# Patient Record
Sex: FEMALE | Race: BLACK OR AFRICAN AMERICAN | NOT HISPANIC OR LATINO | Employment: FULL TIME | URBAN - METROPOLITAN AREA
[De-identification: names, ages, dates, MRNs, and addresses within clinical notes are randomized per-mention and may not be internally consistent; named-entity substitution may affect disease eponyms.]

---

## 2021-05-25 ENCOUNTER — OFFICE VISIT (OUTPATIENT)
Dept: INTERNAL MEDICINE | Facility: CLINIC | Age: 46
End: 2021-05-25
Payer: COMMERCIAL

## 2021-05-25 ENCOUNTER — ANCILLARY PROCEDURE (OUTPATIENT)
Dept: GENERAL RADIOLOGY | Facility: CLINIC | Age: 46
End: 2021-05-25
Payer: COMMERCIAL

## 2021-05-25 VITALS
WEIGHT: 293 LBS | HEART RATE: 86 BPM | OXYGEN SATURATION: 97 % | DIASTOLIC BLOOD PRESSURE: 92 MMHG | TEMPERATURE: 96.7 F | SYSTOLIC BLOOD PRESSURE: 132 MMHG

## 2021-05-25 DIAGNOSIS — M25.562 ACUTE PAIN OF LEFT KNEE: ICD-10-CM

## 2021-05-25 DIAGNOSIS — M25.562 ACUTE PAIN OF LEFT KNEE: Primary | ICD-10-CM

## 2021-05-25 DIAGNOSIS — Z23 NEED FOR VACCINATION: ICD-10-CM

## 2021-05-25 PROCEDURE — 90471 IMMUNIZATION ADMIN: CPT | Performed by: INTERNAL MEDICINE

## 2021-05-25 PROCEDURE — 99204 OFFICE O/P NEW MOD 45 MIN: CPT | Mod: 25 | Performed by: INTERNAL MEDICINE

## 2021-05-25 PROCEDURE — 90715 TDAP VACCINE 7 YRS/> IM: CPT | Performed by: INTERNAL MEDICINE

## 2021-05-25 PROCEDURE — 73562 X-RAY EXAM OF KNEE 3: CPT | Mod: LT | Performed by: RADIOLOGY

## 2021-05-25 RX ORDER — DICLOFENAC SODIUM 75 MG/1
75 TABLET, DELAYED RELEASE ORAL 2 TIMES DAILY PRN
Qty: 30 TABLET | Refills: 1 | Status: SHIPPED | OUTPATIENT
Start: 2021-05-25 | End: 2024-03-07

## 2021-05-25 NOTE — LETTER
May 25, 2021      Cathy Brown  [address] [facility] [address]         To Whom It May Concern:    Cathy Brown was seen in our clinic. She is dealing with an injury. 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 Please accommodate her during this time.      Sincerely,        Frank Diaz MD

## 2021-05-25 NOTE — PATIENT INSTRUCTIONS
- Sign-up for Rebls and I will send you a message on Rebls when I am able to look at the results of your X-ray from today

## 2021-06-03 ENCOUNTER — TELEPHONE (OUTPATIENT)
Dept: INTERNAL MEDICINE | Facility: CLINIC | Age: 46
End: 2021-06-03

## 2021-06-03 NOTE — TELEPHONE ENCOUNTER
Form filled out to the best of my knowledge, med list attached.     Put in Dr. Diaz's folder for signature.

## 2021-06-03 NOTE — TELEPHONE ENCOUNTER
Provider Statement    Reason for Call:  Form, our goal is to have forms completed with 72 hours, however, some forms may require a visit or additional information.    Type of letter, form or note:  FMLA    Who is the form from?: Insurance comp    Where did the form come from: form was faxed in    What clinic location was the form placed at?: Internal Medicine    Where the form was placed: Given to MA/RN    What number is listed as a contact on the form?: none       Additional comments: fax back to [phone]    Call taken on 6/3/2021 at 9:20 AM by Lucy Vega

## 2021-06-27 ENCOUNTER — HEALTH MAINTENANCE LETTER (OUTPATIENT)
Age: 46
End: 2021-06-27

## 2021-10-17 ENCOUNTER — HEALTH MAINTENANCE LETTER (OUTPATIENT)
Age: 46
End: 2021-10-17

## 2022-02-25 ENCOUNTER — LAB (OUTPATIENT)
Dept: URGENT CARE | Facility: URGENT CARE | Age: 47
End: 2022-02-25
Payer: COMMERCIAL

## 2022-02-25 DIAGNOSIS — Z20.822 SUSPECTED COVID-19 VIRUS INFECTION: ICD-10-CM

## 2022-02-25 LAB — SARS-COV-2 RNA RESP QL NAA+PROBE: NEGATIVE

## 2022-02-25 PROCEDURE — U0003 INFECTIOUS AGENT DETECTION BY NUCLEIC ACID (DNA OR RNA); SEVERE ACUTE RESPIRATORY SYNDROME CORONAVIRUS 2 (SARS-COV-2) (CORONAVIRUS DISEASE [COVID-19]), AMPLIFIED PROBE TECHNIQUE, MAKING USE OF HIGH THROUGHPUT TECHNOLOGIES AS DESCRIBED BY CMS-2020-01-R: HCPCS

## 2022-02-25 PROCEDURE — U0005 INFEC AGEN DETEC AMPLI PROBE: HCPCS

## 2022-07-21 ENCOUNTER — OFFICE VISIT (OUTPATIENT)
Dept: INTERNAL MEDICINE | Facility: CLINIC | Age: 47
End: 2022-07-21
Payer: COMMERCIAL

## 2022-07-21 VITALS
HEIGHT: 65 IN | SYSTOLIC BLOOD PRESSURE: 136 MMHG | DIASTOLIC BLOOD PRESSURE: 81 MMHG | HEART RATE: 92 BPM | WEIGHT: 285 LBS | BODY MASS INDEX: 47.48 KG/M2 | TEMPERATURE: 98.8 F | OXYGEN SATURATION: 97 %

## 2022-07-21 DIAGNOSIS — Z87.828 H/O LACERATION OF SKIN: Primary | ICD-10-CM

## 2022-07-21 DIAGNOSIS — E66.01 MORBID OBESITY (H): ICD-10-CM

## 2022-07-21 PROBLEM — Z12.31 VISIT FOR SCREENING MAMMOGRAM: Status: ACTIVE | Noted: 2022-07-21

## 2022-07-21 PROCEDURE — 99213 OFFICE O/P EST LOW 20 MIN: CPT | Performed by: INTERNAL MEDICINE

## 2022-07-21 NOTE — LETTER
[facility]  [address] [address]  [phone]      2022     Regarding   Cathy Brown  [address] [facility] [address]    75     To whom it may concern,  Cathy Brown was seen in clinic today for follow-up of skin lacerations. Please excuse her from work  between today and 22 to allow for further skin healing. She may return to work without restrictions starting 22. If you have further questions regarding this matter, please feel free to contact me at [phone].    Sincerely,            Kash Vanessa MD  Internal Medicine

## 2022-07-21 NOTE — PROGRESS NOTES
ASSESSMENT:    1. H/O laceration of skin  A total of 16 sutures removed (10 from hand and 6 from thigh) without complication.  Steri-Strips used afterwards as below the patient was instructed that these will fall off on their own.  After they had fallen off, patient was instructed when washing her hand and thigh to go parallel to the laceration site until skin was fully healed.  Patient was instructed to contact clinic if developed any new drainage or redness around the wound site    2. Morbid obesity (H)  Counseled regarding calorie/carbohydrate reduction for weight loss      PLAN:  Keep the hand dry for 24 hours.  Then may get hands wet if showering Or Washing Hands after going to Bathroom.  Pat the skin dry with towel.  After Steri-Strips fall off, then try to wash parallel to the laceration direction until skin fully healed so as not to pull open the wounds   Contact clinic if any new drainage or redness developing around the wound sites  Reduce calorie/carbohydrate (sugar, bread, potato, pasta, rice, alcohol etc)  intake in diet.  Increase color on your plate with fruits and vegetables. Increase  frequency of walking or other aerobic exercise as able (goal is daily)   I would recommend a covid booster vaccination. You may have it done at any pharmacy. If you wish to have it done at a [facility] pharmacy, then go to [email or web address] to schedule a vaccination appointment  Remain off of work for the next 10 days to let the wounds heal further.  May return to work on 8/1/2022.  Letter written      (Chart documentation was completed, in part, with [employer] voice-recognition software. Even though reviewed, some grammatical, spelling, and word errors may remain.)    Kash Vanessa MD  Internal Medicine Department  [facility] HADLYE Morgan is a 46 year old, presenting for the following health issues:  Suture Removal (Palm of hand - 10 and back of thigh - 6)      Suture  Removal            Meeting patient for the first time today.  Was seen at [facility] 7/9/22 for hand and leg lacerations.  ER note reviewed.  Part of the note as below:    SUBJECTIVE     HISTORY OF PRESENT ILLNESS  Cathy Brown is a 46 y.o. female with no pertinent past medical history who presents after sustaining a left hand laceration and right posterior thigh laceration. Patient was swimming with family at a get together today when they discovered they were locked within the pool confines. Feeling panicked, the patient tried to climb the gate. The patient's leg became impaled on the gate. EMS was called to assist. Patient had to be lifted off the gate and to assist this effort, she sustained a left hand laceration. The patient was consuming alcohol today as well. Patient has no additional complaints including chest pain, abdominal pain, or shortness of breath. The patient is unaware of her tetanus status, and recently moved to [place] 2 years ago.    ASSESSMENT AND PLAN   Cathy Brown is a 46 y.o. female with no pertinent past medical history presenting with laceration to the left hand and distal posterior medial right thigh. Exam notable for 2 lacerations with distal distributions intact circulatory, sensory, and motor functions. Bleeding is well controlled.    DDx includes but is not limited to retained foreign body, fracture, tendon involvement, neurovascular compromise.     Work-up included x-ray of the left hand and the right knee. There were no visualized retained foreign bodies nor fracture. Exam reassuring that there is no tendon involvement.    MDM/ED Course:       After x-ray examinations were completed, the patient underwent laceration repair. See separate note for details. In brief, the left hand laceration was repaired with 10 sutures and there were no visualized foreign bodies. The thigh laceration had no visualized foreign bodies, and air and blood were expressed from the wound as there was visible air  "on x-ray from the impalement. This was repaired with 6 sutures.    Patient remained hemodynamically stable throughout their stay in the ED. Discussed the plan with the patient, including complications and follow up. The patient expressed understanding and agreement to the plan. The patient was discharged home in good condition after all questions were answered.    Final Clinical Impression  1. Left palm laceration, initial encounter  2. Right posterior thigh laceration, initial encounter    Patient presents today for removal of sutures.  Minimal pain at laceration sites.  Patient has been treating with Tylenol.  Denies drainage from the sites.  Denies fevers or chills.  Denies chest pain, abdominal pain or shortness of breath.  Tetanus shot up-to-date from 2021  Declined COVID booster today    Additional ROS:   Constitutional, HEENT, Cardiovascular, Pulmonary, GI and , Neuro, MSK and Psych review of systems/symptoms are otherwise negative or unchanged from previous, except as noted above.      OBJECTIVE:  /81   Pulse 92   Temp 98.8  F (37.1  C) (Oral)   Ht 1.651 m (5' 5\")   Wt 129.3 kg (285 lb)   SpO2 97%   BMI 47.43 kg/m     Estimated body mass index is 47.43 kg/m  as calculated from the following:    Height as of this encounter: 1.651 m (5' 5\").    Weight as of this encounter: 129.3 kg (285 lb).     Neck: no adenopathy. Thyroid normal to palpation. No bruits  Pulm: Lungs clear to auscultation   CV: Regular rates and rhythm  GI: Soft, nontender, Normal active bowel sounds, No hepatosplenomegaly or masses palpable  Ext: Peripheral pulses intact. No edema.  Neuro: Normal strength and tone, sensory exam grossly normal in hands and feet bilaterally  Skin: Laceration left palm and right inner thigh.  Sutures present.  Some scab formation present over the suture site.    Procedure:  Following verbal consent, a Q-tip was moistened and used to tease off scab formation lying over sutures.  Sutures were then " individually removed using standard suture removal kit without complication.  Following removal of sutures, there were parts of laceration both in the left palm area and right thigh that were slightly .  Therefore, Steri-Strips were placed over both of  the lacerations after sutures were removed so that the skin was lined up properly for continued healing and reduction of scar formation at the laceration sites

## 2022-07-24 ENCOUNTER — HEALTH MAINTENANCE LETTER (OUTPATIENT)
Age: 47
End: 2022-07-24

## 2022-07-25 ENCOUNTER — TELEPHONE (OUTPATIENT)
Dept: INTERNAL MEDICINE | Facility: CLINIC | Age: 47
End: 2022-07-25

## 2022-07-25 NOTE — TELEPHONE ENCOUNTER
Patient currently in IM waiting room to  forms that were left with provider on 7/21/22 visit. Please complete and hand to staff to bring out to patient

## 2022-08-07 PROBLEM — E66.01 MORBID OBESITY (H): Status: ACTIVE | Noted: 2022-08-07

## 2022-08-08 NOTE — PATIENT INSTRUCTIONS
Keep the hand dry for 24 hours.  Then may get hands wet if showering Or Washing Hands after going to Bathroom.  Pat the skin dry with towel.  After Steri-Strips fall off, then try to wash parallel to the laceration direction until skin fully healed so as not to pull open the wounds   Contact clinic if any new drainage or redness developing around the wound sites  Reduce calorie/carbohydrate (sugar, bread, potato, pasta, rice, alcohol etc)  intake in diet.  Increase color on your plate with fruits and vegetables. Increase  frequency of walking or other aerobic exercise as able (goal is daily)   I would recommend a covid booster vaccination. You may have it done at any pharmacy. If you wish to have it done at a [facility] pharmacy, then go to [email or web address] to schedule a vaccination appointment  Remain off of work for the next 10 days to let the wounds heal further.  May return to work on 8/1/2022.  Letter written

## 2022-10-03 ENCOUNTER — HEALTH MAINTENANCE LETTER (OUTPATIENT)
Age: 47
End: 2022-10-03

## 2023-08-12 ENCOUNTER — HEALTH MAINTENANCE LETTER (OUTPATIENT)
Age: 48
End: 2023-08-12

## 2023-12-06 ENCOUNTER — HOSPITAL ENCOUNTER (EMERGENCY)
Facility: CLINIC | Age: 48
Discharge: HOME | End: 2023-12-06
Attending: EMERGENCY MEDICINE | Admitting: EMERGENCY MEDICINE
Payer: COMMERCIAL

## 2023-12-06 ENCOUNTER — APPOINTMENT (OUTPATIENT)
Dept: CT IMAGING | Facility: CLINIC | Age: 48
End: 2023-12-06
Attending: EMERGENCY MEDICINE
Payer: COMMERCIAL

## 2023-12-06 VITALS
SYSTOLIC BLOOD PRESSURE: 169 MMHG | TEMPERATURE: 98.4 F | DIASTOLIC BLOOD PRESSURE: 68 MMHG | HEART RATE: 81 BPM | OXYGEN SATURATION: 100 % | RESPIRATION RATE: 16 BRPM

## 2023-12-06 DIAGNOSIS — R07.89 OTHER CHEST PAIN: ICD-10-CM

## 2023-12-06 LAB
ANION GAP SERPL CALCULATED.3IONS-SCNC: 10 MMOL/L (ref 7–15)
BASOPHILS # BLD AUTO: 0 10E3/UL (ref 0–0.2)
BASOPHILS NFR BLD AUTO: 0 %
BUN SERPL-MCNC: 7.8 MG/DL (ref 6–20)
CALCIUM SERPL-MCNC: 9.4 MG/DL (ref 8.6–10)
CHLORIDE SERPL-SCNC: 101 MMOL/L (ref 98–107)
CREAT SERPL-MCNC: 0.79 MG/DL (ref 0.51–0.95)
D DIMER PPP FEU-MCNC: 0.69 UG/ML FEU (ref 0–0.5)
DEPRECATED HCO3 PLAS-SCNC: 28 MMOL/L (ref 22–29)
EGFRCR SERPLBLD CKD-EPI 2021: >90 ML/MIN/1.73M2
EOSINOPHIL # BLD AUTO: 0.4 10E3/UL (ref 0–0.7)
EOSINOPHIL NFR BLD AUTO: 4 %
ERYTHROCYTE [DISTWIDTH] IN BLOOD BY AUTOMATED COUNT: 14 % (ref 10–15)
GLUCOSE SERPL-MCNC: 136 MG/DL (ref 70–99)
HCT VFR BLD AUTO: 42.4 % (ref 35–47)
HGB BLD-MCNC: 13.7 G/DL (ref 11.7–15.7)
HOLD SPECIMEN: NORMAL
IMM GRANULOCYTES # BLD: 0 10E3/UL
IMM GRANULOCYTES NFR BLD: 0 %
LYMPHOCYTES # BLD AUTO: 3.4 10E3/UL (ref 0.8–5.3)
LYMPHOCYTES NFR BLD AUTO: 32 %
MCH RBC QN AUTO: 27.3 PG (ref 26.5–33)
MCHC RBC AUTO-ENTMCNC: 32.3 G/DL (ref 31.5–36.5)
MCV RBC AUTO: 85 FL (ref 78–100)
MONOCYTES # BLD AUTO: 0.9 10E3/UL (ref 0–1.3)
MONOCYTES NFR BLD AUTO: 8 %
NEUTROPHILS # BLD AUTO: 5.7 10E3/UL (ref 1.6–8.3)
NEUTROPHILS NFR BLD AUTO: 56 %
NRBC # BLD AUTO: 0 10E3/UL
NRBC BLD AUTO-RTO: 0 /100
PLATELET # BLD AUTO: 309 10E3/UL (ref 150–450)
POTASSIUM SERPL-SCNC: 4.1 MMOL/L (ref 3.4–5.3)
RBC # BLD AUTO: 5.01 10E6/UL (ref 3.8–5.2)
SODIUM SERPL-SCNC: 139 MMOL/L (ref 135–145)
TROPONIN T SERPL HS-MCNC: 10 NG/L
TSH SERPL DL<=0.005 MIU/L-ACNC: 0.64 UIU/ML (ref 0.3–4.2)
WBC # BLD AUTO: 10.3 10E3/UL (ref 4–11)

## 2023-12-06 PROCEDURE — 84443 ASSAY THYROID STIM HORMONE: CPT | Performed by: EMERGENCY MEDICINE

## 2023-12-06 PROCEDURE — 250N000011 HC RX IP 250 OP 636: Performed by: EMERGENCY MEDICINE

## 2023-12-06 PROCEDURE — 250N000011 HC RX IP 250 OP 636: Mod: JZ | Performed by: EMERGENCY MEDICINE

## 2023-12-06 PROCEDURE — 36415 COLL VENOUS BLD VENIPUNCTURE: CPT | Performed by: EMERGENCY MEDICINE

## 2023-12-06 PROCEDURE — 85004 AUTOMATED DIFF WBC COUNT: CPT | Performed by: EMERGENCY MEDICINE

## 2023-12-06 PROCEDURE — 93005 ELECTROCARDIOGRAM TRACING: CPT

## 2023-12-06 PROCEDURE — 71275 CT ANGIOGRAPHY CHEST: CPT

## 2023-12-06 PROCEDURE — 84484 ASSAY OF TROPONIN QUANT: CPT | Performed by: EMERGENCY MEDICINE

## 2023-12-06 PROCEDURE — 99285 EMERGENCY DEPT VISIT HI MDM: CPT | Mod: 25

## 2023-12-06 PROCEDURE — 96374 THER/PROPH/DIAG INJ IV PUSH: CPT | Mod: 59

## 2023-12-06 PROCEDURE — 85379 FIBRIN DEGRADATION QUANT: CPT | Performed by: EMERGENCY MEDICINE

## 2023-12-06 PROCEDURE — 250N000009 HC RX 250: Performed by: EMERGENCY MEDICINE

## 2023-12-06 PROCEDURE — 80048 BASIC METABOLIC PNL TOTAL CA: CPT | Performed by: EMERGENCY MEDICINE

## 2023-12-06 RX ORDER — KETOROLAC TROMETHAMINE 15 MG/ML
15 INJECTION, SOLUTION INTRAMUSCULAR; INTRAVENOUS ONCE
Status: COMPLETED | OUTPATIENT
Start: 2023-12-06 | End: 2023-12-06

## 2023-12-06 RX ORDER — IOPAMIDOL 755 MG/ML
500 INJECTION, SOLUTION INTRAVASCULAR ONCE
Status: COMPLETED | OUTPATIENT
Start: 2023-12-06 | End: 2023-12-06

## 2023-12-06 RX ADMIN — SODIUM CHLORIDE 90 ML: 9 INJECTION, SOLUTION INTRAVENOUS at 17:49

## 2023-12-06 RX ADMIN — KETOROLAC TROMETHAMINE 15 MG: 15 INJECTION, SOLUTION INTRAMUSCULAR; INTRAVENOUS at 18:36

## 2023-12-06 RX ADMIN — IOPAMIDOL 77 ML: 755 INJECTION, SOLUTION INTRAVENOUS at 17:49

## 2023-12-06 ASSESSMENT — ACTIVITIES OF DAILY LIVING (ADL)
ADLS_ACUITY_SCORE: 35
ADLS_ACUITY_SCORE: 35

## 2023-12-06 NOTE — ED TRIAGE NOTES
Pt states that she has been having right sided chest pain that she rates at a 8/10 she states it is a pressure pain that does not radiate.  Pt was just in [place] for her mother's , mom passed away from heart failure.

## 2023-12-06 NOTE — ED NOTES
Bed: ED37  Expected date: 12/6/23  Expected time: 3:01 PM  Means of arrival:   Comments:  Needs bed

## 2023-12-06 NOTE — ED PROVIDER NOTES
History     Chief Complaint:  Chest Pain    The history is provided by the patient.     Cathy Brown is a 47 year old female presenting for evaluation of chest pain. Cathy reports intermittent central and right-sided chest pain for the past two weeks that became constant in the past three days. She endorses pain currently in the ED. She denies pain exacerbation with walking. She adds that the pain began when her mother . She denies shortness of breath, fever, chills, cough, abdominal pain, nausea, or vomiting. She denies leg swelling or use of birth control. She denies previous abdominal surgeries. She notes use of Tylenol yesterday at work.    Independent Historian:   None - Patient Only    Review of External Notes:   NA     Medications:    Voltaren    Past Medical History:    The patient has no known pertinent past medical history.    Physical Exam   Patient Vitals for the past 24 hrs:   BP Temp Temp src Pulse Resp SpO2   23 1900 (!) 169/68 -- -- 81 -- 100 %   23 1836 (!) 147/88 -- -- 85 -- 100 %   23 1730 134/75 -- -- 75 -- 98 %   23 1700 133/71 -- -- 84 -- 98 %   23 1630 130/79 -- -- 84 -- 99 %   23 1615 -- -- -- 85 -- 98 %   23 1600 (!) 169/93 -- -- 87 -- 97 %   23 1545 -- -- -- 96 -- 99 %   23 1530 (!) 171/85 -- -- 90 -- 99 %   23 1506 (!) 202/113 98.4  F (36.9  C) Temporal 102 16 99 %     Physical Exam  General: Resting on the bed.  Head: No obvious trauma to head.  Ears, Nose, Throat:  External ears normal.  Nose normal.  No pharyngeal erythema, swelling or exudate.  Midline uvula. Moist mucus membranes.  Eyes:  Conjunctivae clear.   Neck: Normal range of motion.  Neck supple.   CV: Regular rate and rhythm.  No murmurs.      Chest wall: Pain not reproducible with chest wall palpation.  Respiratory: Effort normal and breath sounds normal.  No wheezing or crackles.   Gastrointestinal: Soft.  No distension. There is no tenderness.  There is no  rigidity, no rebound and no guarding.   Musculoskeletal: Normal range of motion.  Non tender extremities to palpations. No lower extremity edema.   Neuro: Alert. Moving all extremities appropriately.  Normal speech.    Skin: Skin is warm and dry.  No rash noted.   Psych: Normal mood and affect. Behavior is normal.    Emergency Department Course   ECG  ECG taken at 1523, ECG read at 1524  Normal sinus rhythm  Incomplete left bundle branch block  Prolonged QT  Abnormal ECG   Rate 99 bpm. DE interval 174 ms. QRS duration 108 ms. QT/QTc 386/495 ms. P-R-T axes 52 13 -3.     Imaging:  CT Chest Pulmonary Embolism w Contrast   Final Result   IMPRESSION:      1.  No acute pulmonary embolism.   2.  No acute lung parenchymal or pleural space process.   3.  Enlarged left ventricle, suspect non-ischemic cardiomyopathy.    4.  Enlarged, heterogeneous attenuation thyroid gland consistent with goiter. Mass effect and narrowing of the lumen of the trachea above the thoracic inlet.         Laboratory:  Labs Ordered and Resulted from Time of ED Arrival to Time of ED Departure   BASIC METABOLIC PANEL - Abnormal       Result Value    Sodium 139      Potassium 4.1      Chloride 101      Carbon Dioxide (CO2) 28      Anion Gap 10      Urea Nitrogen 7.8      Creatinine 0.79      GFR Estimate >90      Calcium 9.4      Glucose 136 (*)    D DIMER QUANTITATIVE - Abnormal    D-Dimer Quantitative 0.69 (*)    TROPONIN T, HIGH SENSITIVITY - Normal    Troponin T, High Sensitivity 10     TSH WITH FREE T4 REFLEX - Normal    TSH 0.64     CBC WITH PLATELETS AND DIFFERENTIAL    WBC Count 10.3      RBC Count 5.01      Hemoglobin 13.7      Hematocrit 42.4      MCV 85      MCH 27.3      MCHC 32.3      RDW 14.0      Platelet Count 309      % Neutrophils 56      % Lymphocytes 32      % Monocytes 8      % Eosinophils 4      % Basophils 0      % Immature Granulocytes 0      NRBCs per 100 WBC 0      Absolute Neutrophils 5.7      Absolute Lymphocytes 3.4       Absolute Monocytes 0.9      Absolute Eosinophils 0.4      Absolute Basophils 0.0      Absolute Immature Granulocytes 0.0      Absolute NRBCs 0.0          Procedures     Emergency Department Course & Assessments:       Interventions:  Medications   CT Scan Flush (90 mLs Intravenous $Given 12/6/23 1749)   iopamidol (ISOVUE-370) solution 500 mL (77 mLs Intravenous $Given 12/6/23 1749)   ketorolac (TORADOL) injection 15 mg (15 mg Intravenous $Given 12/6/23 1836)        Assessments:  1520 I obtained history and examined the patient as noted above.     Independent Interpretation (X-rays, CTs, rhythm strip):  None    Consultations/Discussion of Management or Tests:  None        Social Determinants of Health affecting care:   None    Disposition:  The patient was discharged to home.     Impression & Plan    Medical Decision Making:  Patient presents with chest pain.  She was significantly hypertensive upon arrival with a blood pressure of 202/113.  This did significantly improve to normotensive spontaneously.  EKG shows no ischemic changes.  Troponin is not elevated.  No leukocytosis.  TSH is within normal limits.  D-dimer is elevated.  CT is obtained and shows no evidence of pulmonary embolism.  She is given Toradol.  Upon reevaluation, she reports her chest pain has completely resolved.  It is likely that her pain could be secondary to musculoskeletal pain.  She is encouraged to take over-the-counter pain medication, and to follow-up with her primary care provider.  Return precautions are given and she verbalizes understanding.  She is discharged home in stable condition.    Diagnosis:    ICD-10-CM    1. Other chest pain  R07.89            Discharge Medications:  Discharge Medication List as of 12/6/2023  7:28 PM         Scribe Disclosure:  Araceli WOOTEN, am serving as a scribe at 3:18 PM on 12/6/2023 to document services personally performed by Dixon Quiles MD based on my observations and the provider's  statements to me.   12/6/2023   Dixon Quiles MD Peery, Stephen, MD  12/06/23 [id number]

## 2023-12-07 LAB
ATRIAL RATE - MUSE: 99 BPM
DIASTOLIC BLOOD PRESSURE - MUSE: NORMAL MMHG
INTERPRETATION ECG - MUSE: NORMAL
P AXIS - MUSE: 52 DEGREES
PR INTERVAL - MUSE: 174 MS
QRS DURATION - MUSE: 108 MS
QT - MUSE: 386 MS
QTC - MUSE: 495 MS
R AXIS - MUSE: 13 DEGREES
SYSTOLIC BLOOD PRESSURE - MUSE: NORMAL MMHG
T AXIS - MUSE: -3 DEGREES
VENTRICULAR RATE- MUSE: 99 BPM

## 2023-12-07 NOTE — DISCHARGE INSTRUCTIONS
Your lab work, CT of the chest, and EKG all look good.  You are not having a heart attack.  Your pain improved after taking any anti-inflammatory medication.  It is likely that your pain is secondary to musculoskeletal pain.  We recommend that you take over-the-counter pain medication for this for the next few days.  You can take 1000 mg of Tylenol every 6 hours, and 800 mg of ibuprofen every 6 hours.  If you alternate these, you will take one or the other every 3 hours.  We recommend that you follow-up with your primary care provider.  Please return to the emergency department if you develop any new or concerning symptoms.

## 2024-01-09 ENCOUNTER — TRANSFERRED RECORDS (OUTPATIENT)
Dept: MULTI SPECIALTY CLINIC | Facility: CLINIC | Age: 49
End: 2024-01-09

## 2024-03-07 ENCOUNTER — OFFICE VISIT (OUTPATIENT)
Dept: INTERNAL MEDICINE | Facility: CLINIC | Age: 49
End: 2024-03-07
Payer: COMMERCIAL

## 2024-03-07 VITALS
TEMPERATURE: 97.5 F | WEIGHT: 293 LBS | SYSTOLIC BLOOD PRESSURE: 149 MMHG | DIASTOLIC BLOOD PRESSURE: 88 MMHG | OXYGEN SATURATION: 97 % | HEIGHT: 65 IN | BODY MASS INDEX: 48.82 KG/M2 | HEART RATE: 87 BPM

## 2024-03-07 DIAGNOSIS — M25.531 RIGHT WRIST PAIN: ICD-10-CM

## 2024-03-07 DIAGNOSIS — R73.9 HYPERGLYCEMIA: ICD-10-CM

## 2024-03-07 DIAGNOSIS — Z11.59 NEED FOR HEPATITIS C SCREENING TEST: ICD-10-CM

## 2024-03-07 DIAGNOSIS — I10 PRIMARY HYPERTENSION: Primary | ICD-10-CM

## 2024-03-07 DIAGNOSIS — E11.9 TYPE 2 DIABETES MELLITUS WITHOUT COMPLICATION, WITHOUT LONG-TERM CURRENT USE OF INSULIN (H): ICD-10-CM

## 2024-03-07 DIAGNOSIS — Z12.4 CERVICAL CANCER SCREENING: ICD-10-CM

## 2024-03-07 DIAGNOSIS — Z12.11 SCREEN FOR COLON CANCER: ICD-10-CM

## 2024-03-07 DIAGNOSIS — Z11.4 SCREENING FOR HIV (HUMAN IMMUNODEFICIENCY VIRUS): ICD-10-CM

## 2024-03-07 DIAGNOSIS — Z12.31 VISIT FOR SCREENING MAMMOGRAM: ICD-10-CM

## 2024-03-07 PROCEDURE — 99214 OFFICE O/P EST MOD 30 MIN: CPT | Mod: 25 | Performed by: INTERNAL MEDICINE

## 2024-03-07 PROCEDURE — 90471 IMMUNIZATION ADMIN: CPT | Performed by: INTERNAL MEDICINE

## 2024-03-07 PROCEDURE — 90686 IIV4 VACC NO PRSV 0.5 ML IM: CPT | Performed by: INTERNAL MEDICINE

## 2024-03-07 PROCEDURE — 90480 ADMN SARSCOV2 VAC 1/ONLY CMP: CPT | Performed by: INTERNAL MEDICINE

## 2024-03-07 PROCEDURE — 91320 SARSCV2 VAC 30MCG TRS-SUC IM: CPT | Performed by: INTERNAL MEDICINE

## 2024-03-07 RX ORDER — HYDROCHLOROTHIAZIDE 12.5 MG/1
12.5 TABLET ORAL
Qty: 90 TABLET | Refills: 0 | Status: SHIPPED | OUTPATIENT
Start: 2024-03-07 | End: 2024-05-14

## 2024-03-07 NOTE — PATIENT INSTRUCTIONS
Patient Education   DASH Diet: Care Instructions  Your Care Instructions     The DASH diet is an eating plan that can help lower your blood pressure. DASH stands for Dietary Approaches to Stop Hypertension. Hypertension is high blood pressure.  The DASH diet focuses on eating foods that are high in calcium, potassium, and magnesium. These nutrients can lower blood pressure. The foods that are highest in these nutrients are fruits, vegetables, low-fat dairy products, nuts, seeds, and legumes. But taking calcium, potassium, and magnesium supplements instead of eating foods that are high in those nutrients does not have the same effect. The DASH diet also includes whole grains, fish, and poultry.  The DASH diet is one of several lifestyle changes your doctor may recommend to lower your high blood pressure. Your doctor may also want you to decrease the amount of sodium in your diet. Lowering sodium while following the DASH diet can lower blood pressure even further than just the DASH diet alone.  Follow-up care is a key part of your treatment and safety. Be sure to make and go to all appointments, and call your doctor if you are having problems. It's also a good idea to know your test results and keep a list of the medicines you take.  How can you care for yourself at home?  Following the DASH diet  Eat 4 to 5 servings of fruit each day. A serving is 1 medium-sized piece of fruit, 1/2 cup raw or canned fruit, 1/4 cup dried fruit, or 4 ounces (1/2 cup) of fruit juice. Choose fruit more often than fruit juice.  Eat 4 to 5 servings of vegetables each day. A serving is 1 cup of lettuce or raw leafy vegetables, 1/2 cup of chopped or cooked vegetables, or 4 ounces (1/2 cup) of vegetable juice. Choose vegetables more often than vegetable juice.  Get 2 to 3 servings of low-fat and fat-free dairy each day. A serving is 8 ounces of milk, 1 cup of yogurt, or 1  ounces of cheese.  Eat 6 to 8 servings of grains each day. A serving  is 1 slice of bread, 1 ounce of dry cereal, or 1/2 cup of cooked rice, pasta, or cooked cereal. Try to choose whole-grain products as much as possible.  Limit lean meat, poultry, and fish to 6 ounces or less each day. One egg counts as 1 ounce.  Eat 4 to 5 servings of nuts, seeds, and legumes (cooked dried beans, lentils, and split peas) each week. A serving is 1/3 cup of nuts, 2 tablespoons of seeds, 2 tablespoons of peanut butter, or 1/2 cup of cooked beans or peas.  Limit fats and oils to 2 to 3 servings each day. A serving is 1 teaspoon of vegetable oil or 2 tablespoons of salad dressing.  Limit sweets and added sugars to 5 servings or less a week. A serving is 1 tablespoon jelly or jam, 1/2 cup sorbet, or 1 cup of lemonade.  Eat less than 2,300 milligrams (mg) of sodium a day. If you limit your sodium to 1,500 mg a day, you can lower your blood pressure even more.  Be aware that all of these are the suggested number of servings for people who eat 1,800 to 2,000 calories a day. Your recommended number of servings may be different if you need more or fewer calories.  Tips for success  Start small. Make small changes, and stick with them. Once those changes become habit, add a few more changes.  Try some of the following:  Make it a goal to eat a fruit or vegetable at every meal and at snacks. This will make it easy to get the recommended amount of fruits and vegetables each day.  Try yogurt topped with fruit and nuts for a snack or healthy dessert.  Add lettuce, tomato, cucumber, and onion to sandwiches.  Have a variety of cut-up vegetables with a low-fat dip as an appetizer instead of chips and dip.  Sprinkle sunflower seeds or chopped almonds over salads. Or try adding chopped walnuts or almonds to cooked vegetables.  Try some vegetarian meals using beans and peas. Add garbanzo or kidney beans to salads. Make burritos and tacos with mashed olmos beans or black beans.  Where can you learn more?  Go to  "https://www.[employer].net/patiented  Enter H967 in the search box to learn more about \"DASH Diet: Care Instructions.\"  Current as of: February 28, 2023               Content Version: 13.8    5543-3539 [employer].   Care instructions adapted under license by your healthcare professional. If you have questions about a medical condition or this instruction, always ask your healthcare professional. [employer] disclaims any warranty or liability for your use of this information.         "

## 2024-03-07 NOTE — PROGRESS NOTES
"  Assessment & Plan       Primary hypertension  Discussed lifestyle measures only versus starting medication now  Patient would like to start medication at this time  Prescription sent for hydrochlorothiazide 12.5 mg daily  Labs ordered    Hyperglycemia  Check A1c    Right wrist pain  Most likely tendinitis, advised her to wear a wrist splint    Visit for screening mammogram  Due for mammogram, order placed and patient will schedule a physical      MED REC REQUIRED  Post Medication Reconciliation Status:   BMI  Estimated body mass index is 51.32 kg/m  as calculated from the following:    Height as of this encounter: 1.651 m (5' 5\").    Weight as of this encounter: 139.9 kg (308 lb 6.4 oz).   Weight management plan: Discussed healthy diet and exercise guidelines      Follow-up in    Subjective   Cathy is a 48 year old, presenting for the following health issues:  Hypertension  Pt is starting to change diet and start exercising more as of 3/6/2024  Pt states that blood pressure went up after mother passed away       Via the Health Maintenance questionnaire, the patient has reported the following services have been completed -Colonscopy, this information has been sent to the abstraction team.  HPI     Patient has been having high blood pressure since November 2023 after her mother passed away.  Denies any dizziness or chest pains but gets occasional headaches and has swelling of her lower legs at the end of the day.  Has not been on any medication yet for this.    Has been having depression and anxiety symptoms since her mother passed away but has not needed medication.      She moved to [place] 3 years ago from [place] with her wife.    Another concern today is right wrist pain for 1 month.  She is right-handed.  Symptoms happen more at the end of the work shift.        Hypertension Follow-up    Do you check your blood pressure regularly outside of the clinic? Yes   Are you following a low salt diet? Yes  Are your " "blood pressures ever more than 140 on the top number (systolic) OR more   than 90 on the bottom number (diastolic), for example 140/90? Yes  How many servings of fruits and vegetables do you eat daily?  2-3  On average, how many sweetened beverages do you drink each day (Examples: soda, juice, sweet tea, etc.  Do NOT count diet or artificially sweetened beverages)?   0  How many days per week do you exercise enough to make your heart beat faster? 4  How many minutes a day do you exercise enough to make your heart beat faster? 10 - 19  How many days per week do you miss taking your medication? 0        Review of Systems  Constitutional, neuro, ENT, endocrine, pulmonary, cardiac, gastrointestinal, genitourinary, musculoskeletal, integument and psychiatric systems are negative, except as otherwise noted.      Objective    BP (!) 149/88   Pulse 87   Temp 97.5  F (36.4  C) (Temporal)   Ht 1.651 m (5' 5\")   Wt 139.9 kg (308 lb 6.4 oz)   LMP 02/11/2024 (Approximate)   SpO2 97%   BMI 51.32 kg/m    Body mass index is 51.32 kg/m .  Repeat blood pressure check: 147 x 84  Physical Exam   GENERAL: alert and no distress  NECK: no adenopathy, no asymmetry, masses, or scars  RESP: lungs clear to auscultation - no rales, rhonchi or wheezes  CV: regular rate and rhythm, normal S1 S2, no S3 or S4, no murmur, click or rub, no peripheral edema  ABDOMEN: soft, nontender, no hepatosplenomegaly, no masses and bowel sounds normal  MS: no gross musculoskeletal defects noted, no edema  Tinel sign negative both hands          Signed Electronically by: Max Diaz MD    "

## 2024-03-18 ENCOUNTER — LAB (OUTPATIENT)
Dept: LAB | Facility: CLINIC | Age: 49
End: 2024-03-18
Payer: COMMERCIAL

## 2024-03-18 DIAGNOSIS — I10 PRIMARY HYPERTENSION: ICD-10-CM

## 2024-03-18 DIAGNOSIS — Z11.59 NEED FOR HEPATITIS C SCREENING TEST: ICD-10-CM

## 2024-03-18 DIAGNOSIS — Z11.4 SCREENING FOR HIV (HUMAN IMMUNODEFICIENCY VIRUS): ICD-10-CM

## 2024-03-18 DIAGNOSIS — R73.9 HYPERGLYCEMIA: ICD-10-CM

## 2024-03-18 LAB
ALBUMIN SERPL BCG-MCNC: 4.1 G/DL (ref 3.5–5.2)
ALP SERPL-CCNC: 85 U/L (ref 40–150)
ALT SERPL W P-5'-P-CCNC: 66 U/L (ref 0–50)
ANION GAP SERPL CALCULATED.3IONS-SCNC: 9 MMOL/L (ref 7–15)
AST SERPL W P-5'-P-CCNC: 39 U/L (ref 0–45)
BILIRUB SERPL-MCNC: 0.3 MG/DL
BUN SERPL-MCNC: 11.4 MG/DL (ref 6–20)
CALCIUM SERPL-MCNC: 9.5 MG/DL (ref 8.6–10)
CHLORIDE SERPL-SCNC: 103 MMOL/L (ref 98–107)
CHOLEST SERPL-MCNC: 166 MG/DL
CREAT SERPL-MCNC: 0.88 MG/DL (ref 0.51–0.95)
DEPRECATED HCO3 PLAS-SCNC: 28 MMOL/L (ref 22–29)
EGFRCR SERPLBLD CKD-EPI 2021: 81 ML/MIN/1.73M2
FASTING STATUS PATIENT QL REPORTED: YES
GLUCOSE SERPL-MCNC: 140 MG/DL (ref 70–99)
HBA1C MFR BLD: 7.1 % (ref 0–5.6)
HDLC SERPL-MCNC: 39 MG/DL
LDLC SERPL CALC-MCNC: 106 MG/DL
NONHDLC SERPL-MCNC: 127 MG/DL
POTASSIUM SERPL-SCNC: 4 MMOL/L (ref 3.4–5.3)
PROT SERPL-MCNC: 6.8 G/DL (ref 6.4–8.3)
SODIUM SERPL-SCNC: 140 MMOL/L (ref 135–145)
TRIGL SERPL-MCNC: 104 MG/DL

## 2024-03-18 PROCEDURE — 86803 HEPATITIS C AB TEST: CPT

## 2024-03-18 PROCEDURE — 36415 COLL VENOUS BLD VENIPUNCTURE: CPT

## 2024-03-18 PROCEDURE — 80061 LIPID PANEL: CPT

## 2024-03-18 PROCEDURE — 87389 HIV-1 AG W/HIV-1&-2 AB AG IA: CPT

## 2024-03-18 PROCEDURE — 83036 HEMOGLOBIN GLYCOSYLATED A1C: CPT

## 2024-03-18 PROCEDURE — 80053 COMPREHEN METABOLIC PANEL: CPT

## 2024-03-19 LAB
HCV AB SERPL QL IA: NONREACTIVE
HIV 1+2 AB+HIV1 P24 AG SERPL QL IA: NONREACTIVE

## 2024-05-12 DIAGNOSIS — I10 PRIMARY HYPERTENSION: ICD-10-CM

## 2024-05-14 RX ORDER — HYDROCHLOROTHIAZIDE 12.5 MG/1
TABLET ORAL
Qty: 90 TABLET | Refills: 0 | Status: SHIPPED | OUTPATIENT
Start: 2024-05-14 | End: 2024-07-18

## 2024-07-15 DIAGNOSIS — E11.9 TYPE 2 DIABETES MELLITUS WITHOUT COMPLICATION, WITHOUT LONG-TERM CURRENT USE OF INSULIN (H): ICD-10-CM

## 2024-07-15 DIAGNOSIS — I10 PRIMARY HYPERTENSION: ICD-10-CM

## 2024-07-16 RX ORDER — HYDROCHLOROTHIAZIDE 12.5 MG/1
TABLET ORAL
Qty: 90 TABLET | Refills: 0 | OUTPATIENT
Start: 2024-07-16

## 2024-07-17 NOTE — TELEPHONE ENCOUNTER
"  Patient Returning Call ASAP Please     Reason for call:  Patient said she is out of her High blood pressure medication, and that she only received 30 last refill. \"Neyda only does 30, I am out of my medication. Going out of town on Saturday and needs them. Thank you.     Information relayed to patient:  N/A    Patient has additional questions:  N/A      Could we send this information to you in [facility] or would you prefer to receive a phone call?:   Call [phone]    "

## 2024-07-19 RX ORDER — HYDROCHLOROTHIAZIDE 12.5 MG/1
12.5 TABLET ORAL DAILY
Qty: 30 TABLET | Refills: 1 | Status: SHIPPED | OUTPATIENT
Start: 2024-07-19 | End: 2024-08-23

## 2024-07-27 ENCOUNTER — HEALTH MAINTENANCE LETTER (OUTPATIENT)
Age: 49
End: 2024-07-27

## 2024-07-29 ENCOUNTER — APPOINTMENT (OUTPATIENT)
Dept: GENERAL RADIOLOGY | Facility: CLINIC | Age: 49
End: 2024-07-29
Attending: EMERGENCY MEDICINE
Payer: COMMERCIAL

## 2024-07-29 ENCOUNTER — HOSPITAL ENCOUNTER (EMERGENCY)
Facility: CLINIC | Age: 49
Discharge: HOME | End: 2024-07-29
Attending: EMERGENCY MEDICINE | Admitting: EMERGENCY MEDICINE
Payer: COMMERCIAL

## 2024-07-29 VITALS
BODY MASS INDEX: 48.82 KG/M2 | DIASTOLIC BLOOD PRESSURE: 95 MMHG | WEIGHT: 293 LBS | SYSTOLIC BLOOD PRESSURE: 156 MMHG | HEART RATE: 88 BPM | HEIGHT: 65 IN | RESPIRATION RATE: 18 BRPM | OXYGEN SATURATION: 99 % | TEMPERATURE: 97 F

## 2024-07-29 DIAGNOSIS — S60.211A CONTUSION OF RIGHT WRIST, INITIAL ENCOUNTER: ICD-10-CM

## 2024-07-29 PROCEDURE — 250N000013 HC RX MED GY IP 250 OP 250 PS 637: Performed by: EMERGENCY MEDICINE

## 2024-07-29 PROCEDURE — 73130 X-RAY EXAM OF HAND: CPT | Mod: RT

## 2024-07-29 PROCEDURE — 73110 X-RAY EXAM OF WRIST: CPT | Mod: RT

## 2024-07-29 PROCEDURE — 99283 EMERGENCY DEPT VISIT LOW MDM: CPT

## 2024-07-29 RX ORDER — HYDROCODONE BITARTRATE AND ACETAMINOPHEN 5; 325 MG/1; MG/1
1 TABLET ORAL ONCE
Status: COMPLETED | OUTPATIENT
Start: 2024-07-29 | End: 2024-07-29

## 2024-07-29 RX ORDER — IBUPROFEN 600 MG/1
600 TABLET, FILM COATED ORAL ONCE
Status: COMPLETED | OUTPATIENT
Start: 2024-07-29 | End: 2024-07-29

## 2024-07-29 RX ADMIN — IBUPROFEN 600 MG: 600 TABLET, FILM COATED ORAL at 13:52

## 2024-07-29 RX ADMIN — HYDROCODONE BITARTRATE AND ACETAMINOPHEN 1 TABLET: 5; 325 TABLET ORAL at 13:52

## 2024-07-29 ASSESSMENT — COLUMBIA-SUICIDE SEVERITY RATING SCALE - C-SSRS
2. HAVE YOU ACTUALLY HAD ANY THOUGHTS OF KILLING YOURSELF IN THE PAST MONTH?: NO
6. HAVE YOU EVER DONE ANYTHING, STARTED TO DO ANYTHING, OR PREPARED TO DO ANYTHING TO END YOUR LIFE?: NO
1. IN THE PAST MONTH, HAVE YOU WISHED YOU WERE DEAD OR WISHED YOU COULD GO TO SLEEP AND NOT WAKE UP?: NO

## 2024-07-29 ASSESSMENT — ACTIVITIES OF DAILY LIVING (ADL)
ADLS_ACUITY_SCORE: 35
ADLS_ACUITY_SCORE: 35

## 2024-07-29 NOTE — ED PROVIDER NOTES
"  Emergency Department Note      History of Present Illness     Chief Complaint   Wrist Pain    HPI   Cathy Brown is a right-handed 48 year old female who presents with a right arm injury. Patient reports that last night, she was moving a couch with her wife when her right arm became caught between the couch and the wall. Initially, her arm was bleeding. She did not hit her head. Patient says that she is not able to pick anything up with her right hand and she has a burning pain from the elbow down. She has limited ROM to her ring finger and has a small laceration. No pain in her shoulder and no other injuries.      Past Medical History     Medical History and Problem List   History reviewed.  No pertinent past medical history     Medications   The patient is not currently taking any prescribed medications    Physical Exam     Patient Vitals for the past 24 hrs:   BP Temp Temp src Pulse Resp SpO2 Height Weight   07/29/24 1350 (!) 156/95 -- -- 88 18 99 % -- --   07/29/24 1055 (!) 186/106 97  F (36.1  C) Temporal 94 20 98 % 1.651 m (5' 5\") 145.2 kg (320 lb)     Physical Exam  Constitutional:       General: She is not in acute distress.     Appearance: Normal appearance. She is not diaphoretic.   HENT:      Head: Atraumatic.      Mouth/Throat:      Mouth: Mucous membranes are moist.   Eyes:      General: No scleral icterus.     Conjunctiva/sclera: Conjunctivae normal.   Cardiovascular:      Rate and Rhythm: Normal rate and regular rhythm.      Heart sounds: Normal heart sounds.   Pulmonary:      Effort: No respiratory distress.      Breath sounds: Normal breath sounds.   Abdominal:      General: Abdomen is flat.   Musculoskeletal:      Cervical back: Neck supple.      Comments: Diffuse tenderness of the wrist and hand on the right.  No elbow or shoulder tenderness.  Range of motion of the wrist and hand is limited by pain.  Mild swelling over the dorsum of the wrist.  There is an abrasion over the extensor surface of the " proximal phalanx of the right fourth finger.  Flexion extension intact of the fingers.   Skin:     General: Skin is warm.      Capillary Refill: Capillary refill takes less than 2 seconds.      Findings: No rash.   Neurological:      General: No focal deficit present.      Mental Status: She is alert and oriented to person, place, and time.      Comments: Sensation light touch intact over all fingers of the right hand.  Range of motion of the fingers is limited by pain but she is able to move all fingers.           Diagnostics     Imaging   XR Wrist Right 3 Views   Final Result   IMPRESSION: No acute fracture or malalignment. There is normal joint   spacing.       NAYAN SHOEMAKER MD            SYSTEM ID:  [id number]      XR Hand Right 3 Views   Final Result   IMPRESSION: No acute fracture or malalignment. There is normal joint   spacing.       NAYAN SHOEMAKER MD            SYSTEM ID:  [id number]        Independent Interpretation   X-ray of the right wrist independently interpreted.  No fracture.    ED Course      Medications Administered   Medications   ibuprofen (ADVIL/MOTRIN) tablet 600 mg (600 mg Oral $Given 7/29/24 1352)   HYDROcodone-acetaminophen (NORCO) 5-325 MG per tablet 1 tablet (1 tablet Oral $Given 7/29/24 1352)     ED Course   ED Course as of 07/29/24 1928 Mon Jul 29, 2024   1138 I evaluated the patient, obtained history, and performed a physical exam as detailed above.    1331 I rechecked on the patient and explained the plan for discharge. They are comfortable with this plan.      Medical Decision Making / Diagnosis     KARLI Brown is a 48 year old female who presents to the ED for evaluation of an injury to the right wrist and hand.  X-rays are negative.  This most likely is a soft tissue contusion.  The patient was given a wrist brace.  She will follow-up through primary clinic and otherwise return if worse.    Disposition   The patient was discharged.     Diagnosis     ICD-10-CM    1.  Contusion of right wrist, initial encounter  S60.211A            Scribe Disclosure:  I, Emiliebowen Ortiz, am serving as a scribe at 11:42 AM on 7/29/2024 to document services personally performed by Ken Nick MD based on my observations and the provider's statements to me.        Ken Nick MD  07/29/24 1928

## 2024-07-29 NOTE — ED TRIAGE NOTES
Pt was moving a couch yesterday, right wrist got caught between the wall and the couch. Pt c/o pain.    Triage Assessment (Adult)       Row Name 07/29/24 1059          Triage Assessment    Airway WDL WDL        Respiratory WDL    Respiratory WDL WDL        Skin Circulation/Temperature WDL    Skin Circulation/Temperature WDL WDL        Cardiac WDL    Cardiac WDL WDL        Peripheral/Neurovascular WDL    Peripheral Neurovascular WDL WDL        Cognitive/Neuro/Behavioral WDL    Cognitive/Neuro/Behavioral WDL WDL

## 2024-07-29 NOTE — Clinical Note
Cathy Brown was seen and treated in our emergency department on 7/29/2024.  She may return to work on 08/02/2024.       If you have any questions or concerns, please don't hesitate to call.      Ken Nick MD

## 2024-07-31 ENCOUNTER — PATIENT OUTREACH (OUTPATIENT)
Dept: INTERNAL MEDICINE | Facility: CLINIC | Age: 49
End: 2024-07-31

## 2024-07-31 ENCOUNTER — VIRTUAL VISIT (OUTPATIENT)
Dept: INTERNAL MEDICINE | Facility: CLINIC | Age: 49
End: 2024-07-31
Payer: COMMERCIAL

## 2024-07-31 DIAGNOSIS — F32.1 CURRENT MODERATE EPISODE OF MAJOR DEPRESSIVE DISORDER WITHOUT PRIOR EPISODE (H): ICD-10-CM

## 2024-07-31 DIAGNOSIS — R00.2 PALPITATIONS: Primary | ICD-10-CM

## 2024-07-31 DIAGNOSIS — M25.531 RIGHT WRIST PAIN: ICD-10-CM

## 2024-07-31 DIAGNOSIS — E11.9 TYPE 2 DIABETES MELLITUS WITHOUT COMPLICATION, WITHOUT LONG-TERM CURRENT USE OF INSULIN (H): ICD-10-CM

## 2024-07-31 PROCEDURE — 99214 OFFICE O/P EST MOD 30 MIN: CPT | Mod: 95 | Performed by: INTERNAL MEDICINE

## 2024-07-31 PROCEDURE — G2211 COMPLEX E/M VISIT ADD ON: HCPCS | Mod: 95 | Performed by: INTERNAL MEDICINE

## 2024-07-31 NOTE — PROGRESS NOTES
Cathy is a 48 year old who is being evaluated via a billable Video visit.    How would you like to obtain your AVS? MyChart  If the video visit is dropped, the invitation should be resent by: Text to cell phone: [phone]  Will anyone else be joining your video visit? No  Originating Location (pt. Location): Home    Distant Location (provider location):  On-site    Assessment & Plan       Current moderate episode of major depressive disorder without prior episode (H)  Worsening symptoms  Start Zoloft  Referral placed to psychology    Right wrist pain    Due to acute injury  Continue wearing the brace and OTC naproxen for pain.  Work letter given    Type 2 diabetes mellitus without complication, without long-term current use of insulin (H)  Continue current medications    Palpitations  New symptoms, ordered Zio patch      MED REC REQUIRED  Post Medication Reconciliation Status:     Follow up in 2 to 3 months      The longitudinal plan of care for the diagnosis(es)/condition(s) as documented were addressed during this visit. Due to the added complexity in care, I will continue to support Cathy in the subsequent management and with ongoing continuity of care.      Subjective   Cathy is a 48 year old, presenting for the following health issues:  ER F/U      HPI     ED/UC Followup:    Facility:  [facility] [place] [facility]   Date of visit: 7/29/2024  Reason for visit: Wrist pain   Current Status: still just sore, was given a sling, ibuprofen does help       Patient injured her right wrist while moving furniture.  X-rays were normal in the ER.  Has a lot of swelling and bruising and is wearing a brace.  Pain is slightly improved.  However she is right-handed and is unable to work as she is a .  Wants work excuse letter.    Complaining of palpitations for the last 5 months.  Happening at night.    Denies any exertional chest pain.    She also wants to talk about her mood .  she is also having worsening depression.  Her mother  passed away in November 2023 and she is still depressed over that.  No prior history of depression or anxiety or mental health problems.        Review of Systems  Constitutional, neuro, ENT, endocrine, pulmonary, cardiac, gastrointestinal, genitourinary, musculoskeletal, integument and psychiatric systems are negative, except as otherwise noted.      Objective           Vitals:  No vitals were obtained today due to virtual visit.  Patient very tearful During visit    Physical Exam   General: Alert and no distress //Respiratory: No audible wheeze, cough, or shortness of breath // Psychiatric:  Appropriate affect, tone, and pace of words            Phone call duration: 14 minutes  Signed Electronically by: Max Diaz MD

## 2024-07-31 NOTE — LETTER
July 31, 2024      Cathy Brown  [address] [address] [place] [id number]        To Whom It May Concern:    Cathy Brown  was seen on 7/31/2024.  Please excuse her  until 8/19/2024 due to injury.        Sincerely,        Max Diaz MD

## 2024-08-01 NOTE — TELEPHONE ENCOUNTER
Per chart review- pt had shana with PCP yesterday and contusion of right wrist was discussed. Closing encounter.

## 2024-08-02 ENCOUNTER — TELEPHONE (OUTPATIENT)
Dept: INTERNAL MEDICINE | Facility: CLINIC | Age: 49
End: 2024-08-02
Payer: COMMERCIAL

## 2024-08-02 NOTE — TELEPHONE ENCOUNTER
Forms/Letter Request    Type of form/letter: Disability      Do we have the form/letter: Yes: [employer] form     Who is the form from? [employer]    Where did/will the form come from? form was faxed in    When is form/letter needed by: asap    How would you like the form/letter returned: Fax : [phone]      Patient was seen at [facility] ED on 7/29 for wrist pain.    Virtual visit with PCP on 7/31-discussed rt wrist pain due to acute injury at work.   Progress note from 7/31 visit attached to forms.   Forms placed in PCP folder.

## 2024-08-05 NOTE — TELEPHONE ENCOUNTER
FYI - Status Update    Who is Calling: patient    Update: Pt is requesting a call back asap in regard to forms.    Does caller want a call/response back: Yes     Could we send this information to you in [employer] or would you prefer to receive a phone call?:   Patient would prefer a phone call   Okay to leave a detailed message?: Yes at Cell number on file:    Telephone Information:   Mobile [phone]

## 2024-08-05 NOTE — TELEPHONE ENCOUNTER
Spoke with pt, they just wanted to know that we received the paper work. Writer told pt that PD is not in the office today and will look at paperwork tomorrow.       PD forms in folder     April Tomas VF

## 2024-08-07 ENCOUNTER — ORDERS ONLY (AUTO-RELEASED) (OUTPATIENT)
Dept: INTERNAL MEDICINE | Facility: CLINIC | Age: 49
End: 2024-08-07
Payer: COMMERCIAL

## 2024-08-07 DIAGNOSIS — R00.2 PALPITATIONS: ICD-10-CM

## 2024-08-08 NOTE — TELEPHONE ENCOUNTER
General Call    Contacts       Contact Date/Time Type Contact Phone/Fax    08/05/2024 03:12 PM CDT Phone (Incoming) Cathy Brown (Self) [phone] (M)    08/05/2024 03:12 PM CDT Phone (Incoming) Cathy Brown (Self) [phone] (M)          Reason for Call:  patient called back to check in on the paperwork. Needs the forms to be filled out and faxed back so that she can get paid.     What are your questions or concerns:  has left several messages. Please contact the patient.     Date of last appointment with provider: 7/31/24    Could we send this information to you in [employer] or would you prefer to receive a phone call?:   No preference   Okay to leave a detailed message?: Yes at Cell number on file:    Telephone Information:   Mobile [phone]

## 2024-08-09 NOTE — TELEPHONE ENCOUNTER
"Attempted to call patient to notify of form completion but unable to reach her due to \"phone number not accepting calls at this time\". Will try to call patient again at a later time.    Form faxed to [employer] ([employer]) at [phone]  "

## 2024-08-12 NOTE — TELEPHONE ENCOUNTER
Original paperwork faxed over from [employer] was the wrong [facility] paperwork.  They are refaxing the correct ones over for completion.  Patient needs these filled out asap so she can get paid.

## 2024-08-12 NOTE — TELEPHONE ENCOUNTER
LVM notifying patient of form completion. On callback, please ask if patient would like to  forms or if we should mail them to her. Form has been faxed to employer already. Placed at [facility] TC desk awaiting callback

## 2024-08-13 NOTE — TELEPHONE ENCOUNTER
Received correct forms from [employer] for PCP to complete. Placed in provider's folder for review and completion

## 2024-08-13 NOTE — TELEPHONE ENCOUNTER
Pt called to check on forms, let her know of below, and they are now in provider basket and would expect them to maybe be faxed back tomorrow morning.  Pt appreciative of update.  Carley Frederick, RN  [facility] RN Triage Team

## 2024-08-14 NOTE — TELEPHONE ENCOUNTER
Pt called the clinic back to check the status of the forms from below.     Routing to team to follow up.     Please call pt back with an update.

## 2024-08-15 NOTE — TELEPHONE ENCOUNTER
Patient called requesting update on forms. Patient requesting for forms to be faxed by today if possible because of payroll complications.

## 2024-08-15 NOTE — TELEPHONE ENCOUNTER
Notified patient forms completed and faxed to [phone].   Patient requested copy be mailed to home address, confirmed.

## 2024-08-22 ENCOUNTER — OFFICE VISIT (OUTPATIENT)
Dept: INTERNAL MEDICINE | Facility: CLINIC | Age: 49
End: 2024-08-22
Payer: COMMERCIAL

## 2024-08-22 VITALS
OXYGEN SATURATION: 97 % | WEIGHT: 293 LBS | HEIGHT: 65 IN | DIASTOLIC BLOOD PRESSURE: 99 MMHG | TEMPERATURE: 98 F | HEART RATE: 89 BPM | SYSTOLIC BLOOD PRESSURE: 159 MMHG | BODY MASS INDEX: 48.82 KG/M2

## 2024-08-22 DIAGNOSIS — M25.531 RIGHT WRIST PAIN: Primary | ICD-10-CM

## 2024-08-22 DIAGNOSIS — E66.01 MORBID OBESITY (H): ICD-10-CM

## 2024-08-22 DIAGNOSIS — E11.9 TYPE 2 DIABETES MELLITUS WITHOUT COMPLICATION, WITHOUT LONG-TERM CURRENT USE OF INSULIN (H): ICD-10-CM

## 2024-08-22 DIAGNOSIS — F32.1 CURRENT MODERATE EPISODE OF MAJOR DEPRESSIVE DISORDER WITHOUT PRIOR EPISODE (H): ICD-10-CM

## 2024-08-22 DIAGNOSIS — F41.1 GAD (GENERALIZED ANXIETY DISORDER): ICD-10-CM

## 2024-08-22 DIAGNOSIS — F51.01 PRIMARY INSOMNIA: ICD-10-CM

## 2024-08-22 PROCEDURE — G2211 COMPLEX E/M VISIT ADD ON: HCPCS | Performed by: INTERNAL MEDICINE

## 2024-08-22 PROCEDURE — 96127 BRIEF EMOTIONAL/BEHAV ASSMT: CPT | Performed by: INTERNAL MEDICINE

## 2024-08-22 PROCEDURE — 99214 OFFICE O/P EST MOD 30 MIN: CPT | Performed by: INTERNAL MEDICINE

## 2024-08-22 RX ORDER — DOXEPIN 6 MG/1
6 TABLET, FILM COATED ORAL
Qty: 30 TABLET | Refills: 0 | Status: SHIPPED | OUTPATIENT
Start: 2024-08-22 | End: 2024-09-18

## 2024-08-22 RX ORDER — IBUPROFEN 400 MG/1
400 TABLET, FILM COATED ORAL EVERY 6 HOURS PRN
Qty: 30 TABLET | Refills: 0 | Status: SHIPPED | OUTPATIENT
Start: 2024-08-22

## 2024-08-22 ASSESSMENT — PATIENT HEALTH QUESTIONNAIRE - PHQ9
10. IF YOU CHECKED OFF ANY PROBLEMS, HOW DIFFICULT HAVE THESE PROBLEMS MADE IT FOR YOU TO DO YOUR WORK, TAKE CARE OF THINGS AT HOME, OR GET ALONG WITH OTHER PEOPLE: VERY DIFFICULT
SUM OF ALL RESPONSES TO PHQ QUESTIONS 1-9: 17
SUM OF ALL RESPONSES TO PHQ QUESTIONS 1-9: 17

## 2024-08-22 NOTE — PROGRESS NOTES
Assessment & Plan     Right wrist pain  Persisting pain due to the injury  Repeat x-ray today  Take ibuprofen instead of Tylenol  Referral placed to orthopedics  Will need to extend her time off from work till September 19 th     she states paperwork from her employer will be coming tomorrow.        Type 2 diabetes mellitus without complication, without long-term current use of insulin (H)  Referral placed for eye check  Follow-up in clinic next month for diabetes check    Morbid obesity (H)      MAXIME (generalized anxiety disorder)  Current moderate episode of major depressive disorder without prior episode (H)  Improving, continue Zoloft     Primary insomnia  Trial of doxepin for sleep      Follow-up in 1 month for diabetes exam and physical.    The longitudinal plan of care for the diagnosis(es)/condition(s) as documented were addressed during this visit. Due to the added complexity in care, I will continue to support Cathy in the subsequent management and with ongoing continuity of care.        Depression Screening Follow Up        8/22/2024     2:50 PM   PHQ   PHQ-9 Total Score 17   Q9: Thoughts of better off dead/self-harm past 2 weeks Not at all         8/22/2024     2:50 PM   Last PHQ-9   1.  Little interest or pleasure in doing things 3   2.  Feeling down, depressed, or hopeless 3   3.  Trouble falling or staying asleep, or sleeping too much 3   4.  Feeling tired or having little energy 3   5.  Poor appetite or overeating 2   6.  Feeling bad about yourself 3   7.  Trouble concentrating 0   8.  Moving slowly or restless 0   Q9: Thoughts of better off dead/self-harm past 2 weeks 0   PHQ-9 Total Score 17         Follow Up Actions Taken  Depression Action Plan reviewed with patient.  Started patient on anti-depressant.       Follow-up in 1 month for diabetes check and physical.    Subjective   Cathy is a 48 year old, presenting for the following health issues:  Forms  Burns and hurts to put pressure   Tylenol is  "only helping slightly    Ice helps a little   Movement hurts (doing dishes )   8/10 pain   Pt having difficulty sleeping due to guilt (mom passing) and pain     History of Present Illness       Reason for visit:  Mental health and follow up on my wrist sprain  Symptom onset:  3-4 weeks ago  Symptoms include:  Not sleeping very depressed nonsexual irritaed afraid to go to sleep  Symptom intensity:  Severe  Symptom progression:  Worsening  Had these symptoms before:  Yes  Has tried/received treatment for these symptoms:  No  What makes it worse:  Yes going to work or anyone dying   She is taking medications regularly.       Persists with severe right wrist pain.  Has been wearing the brace and has been taking Tylenol.     Was scheduled to go back to work next week but cannot go to work  like this.    Zoloft is helping her depression symptoms.  However she is still struggling with sleep problems and recurrent nightmares about her mother's death   Wants to try a medication for sleep.          Review of Systems  Constitutional, neuro, ENT, endocrine, pulmonary, cardiac, gastrointestinal, genitourinary, musculoskeletal, integument and psychiatric systems are negative, except as otherwise noted.      Objective    BP (!) 159/99   Pulse 89   Temp 98  F (36.7  C) (Temporal)   Ht 1.651 m (5' 5\")   Wt 134.5 kg (296 lb 9.6 oz)   LMP 07/23/2024 (Exact Date)   SpO2 97%   BMI 49.36 kg/m    Body mass index is 49.36 kg/m .  Physical Exam   GENERAL: alert and no distress  NECK: no adenopathy, no asymmetry, masses, or scars  RESP: lungs clear to auscultation - no rales, rhonchi or wheezes  CV: regular rate and rhythm, normal S1 S2, no S3 or S4, no murmur, click or rub, no peripheral edema  ABDOMEN: soft, nontender, no hepatosplenomegaly, no masses and bowel sounds normal  MS: Right wrist with persistent tenderness on the dorsum with soft tissue swelling   Able to move the fingers and pulse intact  Severely decreased range of " motion of the wrist            Signed Electronically by: Max Diaz MD

## 2024-08-22 NOTE — PROGRESS NOTES
{PROVIDER CHARTING PREFERENCE:882180}    Lewis Morgan is a 48 year old, presenting for the following health issues:  No chief complaint on file.    History of Present Illness           {MA/LPN/RN Pre-Provider Visit Orders- hCG/UA/Strep (Optional):052766}  {SUPERLIST (Optional):857829}  {additonal problems for provider to add (Optional):107078}    {ROS Picklists (Optional):973025}      Objective    LMP 07/23/2024 (Exact Date)   There is no height or weight on file to calculate BMI.  Physical Exam   {Exam List (Optional):793330}    {Diagnostic Test Results (Optional):074010}        Signed Electronically by: Max Diaz MD  {Email feedback regarding this note to [email or web address]   :890489}

## 2024-08-23 DIAGNOSIS — I10 PRIMARY HYPERTENSION: ICD-10-CM

## 2024-08-23 RX ORDER — HYDROCHLOROTHIAZIDE 12.5 MG/1
12.5 TABLET ORAL DAILY
Qty: 30 TABLET | Refills: 1 | Status: SHIPPED | OUTPATIENT
Start: 2024-08-23 | End: 2024-09-18

## 2024-08-26 ENCOUNTER — TELEPHONE (OUTPATIENT)
Dept: INTERNAL MEDICINE | Facility: CLINIC | Age: 49
End: 2024-08-26
Payer: COMMERCIAL

## 2024-08-26 NOTE — TELEPHONE ENCOUNTER
Forms/Letter Request    Type of form/letter: FMLA- extend    Do we have the form/letter: Yes: 2 separate forms placed in provider folder    Who is the form from? employer (if other please explain)    Where did/will the form come from? form was faxed in    When is form/letter needed by: asap    How would you like the form/letter returned: Fax : [phone]

## 2024-08-28 ENCOUNTER — TELEPHONE (OUTPATIENT)
Dept: INTERNAL MEDICINE | Facility: CLINIC | Age: 49
End: 2024-08-28
Payer: COMMERCIAL

## 2024-08-28 NOTE — TELEPHONE ENCOUNTER
Patient Returning Call    Reason for call:  Patient is requesting a call back to get status on FMLA -extended forms. Please check and advise.    Information relayed to patient:  24/48 hrs for a call back    Patient has additional questions:  No      Could we send this information to you in [facility] or would you prefer to receive a phone call?:   Patient would prefer a phone call   Okay to leave a detailed message?: Yes at Other phone number:  [phone]

## 2024-08-29 NOTE — TELEPHONE ENCOUNTER
Completed paperwork  The mental health questionnaire does not need to be filled out.  I clarified with the patient.

## 2024-08-29 NOTE — TELEPHONE ENCOUNTER
Form was faxed by Martha DAMON. Patient spoke with Emily who relayed that mental health form would not be filled out so just medical form was filled out.

## 2024-08-29 NOTE — TELEPHONE ENCOUNTER
Attempt #1: Left Message    Writer left message for patient requesting that they return call to discuss message below:

## 2024-09-04 ENCOUNTER — TELEPHONE (OUTPATIENT)
Dept: INTERNAL MEDICINE | Facility: CLINIC | Age: 49
End: 2024-09-04

## 2024-09-04 ENCOUNTER — HOSPITAL ENCOUNTER (OUTPATIENT)
Dept: GENERAL RADIOLOGY | Facility: CLINIC | Age: 49
Discharge: HOME | End: 2024-09-04
Attending: INTERNAL MEDICINE | Admitting: INTERNAL MEDICINE
Payer: COMMERCIAL

## 2024-09-04 DIAGNOSIS — M25.531 RIGHT WRIST PAIN: ICD-10-CM

## 2024-09-04 PROCEDURE — 73110 X-RAY EXAM OF WRIST: CPT | Mod: RT

## 2024-09-04 NOTE — TELEPHONE ENCOUNTER
Reason for Call:  Appointment Request    Patient requesting this type of appt: Chronic Diease Management/Medication/Follow-Up    Requested provider: Max Diaz    Reason patient unable to be scheduled: Not within requested timeframe    When does patient want to be seen/preferred time:  Provider said she wanted to see pt in September for wrist followup and diabetes.    Comments:      Could we send this information to you in [facility] or would you prefer to receive a phone call?:   No preference   Okay to leave a detailed message?: Yes at Cell number on file:    Telephone Information:   Mobile [phone]       Call taken on 9/4/2024 at 2:21 PM by Kia Preciado

## 2024-09-08 ENCOUNTER — APPOINTMENT (OUTPATIENT)
Dept: GENERAL RADIOLOGY | Facility: CLINIC | Age: 49
End: 2024-09-08
Attending: EMERGENCY MEDICINE
Payer: COMMERCIAL

## 2024-09-08 ENCOUNTER — HOSPITAL ENCOUNTER (EMERGENCY)
Facility: CLINIC | Age: 49
Discharge: HOME | End: 2024-09-08
Attending: EMERGENCY MEDICINE | Admitting: EMERGENCY MEDICINE
Payer: COMMERCIAL

## 2024-09-08 ENCOUNTER — APPOINTMENT (OUTPATIENT)
Dept: CT IMAGING | Facility: CLINIC | Age: 49
End: 2024-09-08
Attending: EMERGENCY MEDICINE
Payer: COMMERCIAL

## 2024-09-08 VITALS
OXYGEN SATURATION: 98 % | HEIGHT: 66 IN | HEART RATE: 93 BPM | BODY MASS INDEX: 47.09 KG/M2 | DIASTOLIC BLOOD PRESSURE: 104 MMHG | SYSTOLIC BLOOD PRESSURE: 186 MMHG | RESPIRATION RATE: 18 BRPM | TEMPERATURE: 97.3 F | WEIGHT: 293 LBS

## 2024-09-08 DIAGNOSIS — M71.22 SYNOVIAL CYST OF LEFT POPLITEAL SPACE: ICD-10-CM

## 2024-09-08 DIAGNOSIS — M25.562 ACUTE PAIN OF LEFT KNEE: ICD-10-CM

## 2024-09-08 DIAGNOSIS — M25.462 EFFUSION OF LEFT KNEE: ICD-10-CM

## 2024-09-08 PROCEDURE — 96372 THER/PROPH/DIAG INJ SC/IM: CPT | Performed by: EMERGENCY MEDICINE

## 2024-09-08 PROCEDURE — 250N000011 HC RX IP 250 OP 636: Performed by: EMERGENCY MEDICINE

## 2024-09-08 PROCEDURE — 99285 EMERGENCY DEPT VISIT HI MDM: CPT | Mod: 25

## 2024-09-08 PROCEDURE — 73562 X-RAY EXAM OF KNEE 3: CPT | Mod: LT

## 2024-09-08 PROCEDURE — 73700 CT LOWER EXTREMITY W/O DYE: CPT | Mod: LT

## 2024-09-08 RX ORDER — KETOROLAC TROMETHAMINE 15 MG/ML
15 INJECTION, SOLUTION INTRAMUSCULAR; INTRAVENOUS ONCE
Status: COMPLETED | OUTPATIENT
Start: 2024-09-08 | End: 2024-09-08

## 2024-09-08 RX ORDER — KETOROLAC TROMETHAMINE 15 MG/ML
15 INJECTION, SOLUTION INTRAMUSCULAR; INTRAVENOUS ONCE
Status: DISCONTINUED | OUTPATIENT
Start: 2024-09-08 | End: 2024-09-08

## 2024-09-08 RX ORDER — KETOROLAC TROMETHAMINE 10 MG/1
10 TABLET, FILM COATED ORAL EVERY 6 HOURS PRN
Qty: 12 TABLET | Refills: 0 | Status: SHIPPED | OUTPATIENT
Start: 2024-09-08

## 2024-09-08 RX ADMIN — KETOROLAC TROMETHAMINE 15 MG: 15 INJECTION, SOLUTION INTRAMUSCULAR; INTRAVENOUS at 14:12

## 2024-09-08 ASSESSMENT — ACTIVITIES OF DAILY LIVING (ADL)
ADLS_ACUITY_SCORE: 33
ADLS_ACUITY_SCORE: 35

## 2024-09-08 NOTE — ED TRIAGE NOTES
"Pt c/o left knee pain that began last weekend. States she went up the stairs and her \"knee went out\", causing her to fall. Pain went away on its own. This occurred again two days later but pain has not decreased since. Denies any other injuries with these falls. No thinners.      Triage Assessment (Adult)       Row Name 09/08/24 1125          Triage Assessment    Airway WDL WDL        Respiratory WDL    Respiratory WDL WDL        Skin Circulation/Temperature WDL    Skin Circulation/Temperature WDL WDL        Cardiac WDL    Cardiac WDL WDL        Peripheral/Neurovascular WDL    Peripheral Neurovascular WDL WDL        Cognitive/Neuro/Behavioral WDL    Cognitive/Neuro/Behavioral WDL WDL                     "

## 2024-09-08 NOTE — ED PROVIDER NOTES
"  Emergency Department Note      History of Present Illness     Chief Complaint   Knee Pain    HPI   Cathy Brown is a 48 year old female presenting with left knee pain. The patient fell twice this week because her knee gave out on her. She caught herself both times. The first time, she experienced pain and swelling, but it \"slacked up\" so she could limp afterwards. 2 days ago, she fell again with no relief of symptoms. She took ibuprofen yesterday but that did not help. The patient denies ankle pain.     Independent Historian   None    Review of External Notes   None    Past Medical History     Medical History and Problem List   No pertinent past medical history     Medications   Doxepin   Hydrochlorothiazide   Metformin   Sertraline     Physical Exam     Patient Vitals for the past 24 hrs:   BP Temp Temp src Pulse Resp SpO2 Height Weight   09/08/24 1128 (!) 186/104 97.3  F (36.3  C) Temporal 93 18 98 % 1.676 m (5' 6\") 134.4 kg (296 lb 4.8 oz)     Physical Exam  Constitutional: Vital signs reviewed.  Pleasant.  HEENT: Moist mucous membranes  Cardiovascular: Regular rate and rhythm  Pulmonary/Chest: Breathing comfortably on room air.  No audible wheezing  Musculoskeletal/Extremities: No bony deformities.  Moves all 4 extremities without difficulty.Swelling and tenderness to the left knee. Unable to fully extend the knee secondary to pain. Normal distal sense and sensation.   Neurological: Alert.  No focal deficits.  Endo: No pitting edema  Skin: No visible rash.  Psychiatric: Pleasant.    Diagnostics     Lab Results   Labs Ordered and Resulted from Time of ED Arrival to Time of ED Departure - No data to display    Imaging   CT Knee Left w/o Contrast   Final Result   IMPRESSION:   1.  No acute fracture or traumatic malalignment in the left knee.   2.  Moderate tricompartmental degenerative arthritis of the left knee.   3.  Moderate knee effusion with multiple intra-articular osteochondral bodies.   4.  Moderate-sized " popliteal cyst.         XR Knee Left 3 Views   Final Result   IMPRESSION: Cortical irregularity along the posterior lateral tibial plateau, which may represent a minimally displaced fracture and could be further evaluated by CT. Moderate knee effusion and soft tissue swelling about the distal quadriceps tendon.    Moderate tricompartmental degenerative arthritis. Large osteochondral body at the anterior aspect of the left knee joint space.          Independent Interpretation   Knee x-ray shows moderate knee effusion and swelling with arthritis.  There is concern for potential tibial plateau fracture.    ED Course      Medications Administered   Medications   ketorolac (TORADOL) injection 15 mg (15 mg Intramuscular $Given 9/8/24 1412)       Procedures   Procedures     Discussion of Management   None    ED Course   ED Course as of 09/08/24 1809   Sun Sep 08, 2024   1400 I obtained the history and examined the patient as above.    1458 I rechecked and updated the patient.         Additional Documentation  None    Medical Decision Making / Diagnosis     CMS Diagnoses: None    MIPS     None    MDM   Cathy Brown is a 48 year old female patient presents after falling twice this week secondary to her knee giving out.  She does have swelling and tenderness to the knee.  She was given Toradol here.  X-ray showed concern for possible tibial plateau fracture as such CT scan was obtained.  Thankfully there is no fracture noted of the knee.  The arthritis is again noted as is a moderate left knee effusion with a moderate-sized popliteal cyst.  Patient was reassured.  She does have a lot of pain in the popliteal fossa so I think the cyst is certainly contributing to her discomfort.  She is unable to fully extend the knee.  So she was given an Ace wrap for compression and crutches.  I discharged home with Toradol.  She will also continue with ice.  Follow-up as needed.    Disposition   The patient was discharged.     Diagnosis      ICD-10-CM    1. Acute pain of left knee  M25.562 Crutches Order      2. Effusion of left knee  M25.462 Crutches Order      3. Synovial cyst of left popliteal space  M71.22 Crutches Order           Discharge Medications   Discharge Medication List as of 9/8/2024  3:30 PM        START taking these medications    Details   ketorolac (TORADOL) 10 MG tablet Take 1 tablet (10 mg) by mouth every 6 hours as needed for moderate pain., Disp-12 tablet, R-0, E-Prescribe               Scribe Disclosure:  SUGAR, Phoenix Peterson, am serving as a scribe at 2:02 PM on 9/8/2024 to document services personally performed by Noah Han MD based on my observations and the provider's statements to me.        Noah Han MD  09/08/24 0118

## 2024-09-18 ENCOUNTER — OFFICE VISIT (OUTPATIENT)
Dept: INTERNAL MEDICINE | Facility: CLINIC | Age: 49
End: 2024-09-18
Payer: COMMERCIAL

## 2024-09-18 VITALS
WEIGHT: 293 LBS | DIASTOLIC BLOOD PRESSURE: 80 MMHG | OXYGEN SATURATION: 97 % | HEART RATE: 93 BPM | SYSTOLIC BLOOD PRESSURE: 120 MMHG | RESPIRATION RATE: 21 BRPM | BODY MASS INDEX: 47.34 KG/M2 | TEMPERATURE: 97 F

## 2024-09-18 DIAGNOSIS — I10 PRIMARY HYPERTENSION: ICD-10-CM

## 2024-09-18 DIAGNOSIS — E11.9 TYPE 2 DIABETES MELLITUS WITHOUT COMPLICATION, WITHOUT LONG-TERM CURRENT USE OF INSULIN (H): ICD-10-CM

## 2024-09-18 DIAGNOSIS — M25.562 ACUTE PAIN OF LEFT KNEE: Primary | ICD-10-CM

## 2024-09-18 DIAGNOSIS — M25.531 RIGHT WRIST PAIN: ICD-10-CM

## 2024-09-18 DIAGNOSIS — F51.01 PRIMARY INSOMNIA: ICD-10-CM

## 2024-09-18 LAB
EST. AVERAGE GLUCOSE BLD GHB EST-MCNC: 146 MG/DL
HBA1C MFR BLD: 6.7 % (ref 0–5.6)

## 2024-09-18 PROCEDURE — 90480 ADMN SARSCOV2 VAC 1/ONLY CMP: CPT | Performed by: INTERNAL MEDICINE

## 2024-09-18 PROCEDURE — 36415 COLL VENOUS BLD VENIPUNCTURE: CPT | Performed by: INTERNAL MEDICINE

## 2024-09-18 PROCEDURE — 91320 SARSCV2 VAC 30MCG TRS-SUC IM: CPT | Performed by: INTERNAL MEDICINE

## 2024-09-18 PROCEDURE — 83036 HEMOGLOBIN GLYCOSYLATED A1C: CPT | Performed by: INTERNAL MEDICINE

## 2024-09-18 PROCEDURE — 90656 IIV3 VACC NO PRSV 0.5 ML IM: CPT | Performed by: INTERNAL MEDICINE

## 2024-09-18 PROCEDURE — 99214 OFFICE O/P EST MOD 30 MIN: CPT | Mod: 25 | Performed by: INTERNAL MEDICINE

## 2024-09-18 PROCEDURE — 90471 IMMUNIZATION ADMIN: CPT | Performed by: INTERNAL MEDICINE

## 2024-09-18 RX ORDER — DOXEPIN 6 MG/1
6 TABLET, FILM COATED ORAL
Qty: 30 TABLET | Refills: 0 | Status: CANCELLED | OUTPATIENT
Start: 2024-09-18

## 2024-09-18 RX ORDER — TRAZODONE HYDROCHLORIDE 50 MG/1
50 TABLET, FILM COATED ORAL AT BEDTIME
Qty: 90 TABLET | Refills: 0 | Status: SHIPPED | OUTPATIENT
Start: 2024-09-18

## 2024-09-18 RX ORDER — HYDROCHLOROTHIAZIDE 12.5 MG/1
12.5 TABLET ORAL DAILY
Qty: 90 TABLET | Refills: 0 | Status: SHIPPED | OUTPATIENT
Start: 2024-09-18

## 2024-09-18 NOTE — PROGRESS NOTES
"  Assessment & Plan     Acute pain of left knee  Orthopedic referral placed  Continue Toradol    Right wrist pain  Pain worsened again after recent fall  [place] paperwork [place]    Type 2 diabetes mellitus without complication, without long-term current use of insulin (H)  Labs ordered  - continue metformin    Primary insomnia  Trial of Trazodone     Primary hypertension  Continue  HCTZ    The longitudinal plan of care for the diagnosis(es)/condition(s) as documented were addressed during this visit. Due to the added complexity in care, I will continue to support Cathy in the subsequent management and with ongoing continuity of care.          BMI  Estimated body mass index is 47.34 kg/m  as calculated from the following:    Height as of 9/8/24: 1.676 m (5' 6\").    Weight as of this encounter: 133 kg (293 lb 4.8 oz).             Lewis Morgan is a 48 year old, presenting for the following health issues:  Diabetes and Forms    History of Present Illness       Diabetes:   She presents for follow up of diabetes.    She is not checking blood glucose.        She is concerned about other.   She is having weight loss.  The patient has not had a diabetic eye exam in the last 12 months.          She eats 2-3 servings of fruits and vegetables daily.She consumes 0 sweetened beverage(s) daily.She exercises with enough effort to increase her heart rate 9 or less minutes per day.  She exercises with enough effort to increase her heart rate 4 days per week.   She is taking medications regularly.     Pt brought in [facility] paperwork for provider to complete at visit.          Fell again about 2 weeks ago.  Has left knee pain and right wrist pain has increased.    Went to ER, CT of knee was negative for fracture, showed popliteal cyst and loose bodies in the knee.    Right wrist Xray was negative for fracture.     Needs new [facility] paperwork filled out.       Was given Doxepin for sleep but it is too expensive.        Review of " Systems  Constitutional, neuro, ENT, endocrine, pulmonary, cardiac, gastrointestinal, genitourinary, musculoskeletal, integument and psychiatric systems are negative, except as otherwise noted.      Objective    /80 (BP Location: Right arm, Patient Position: Sitting, Cuff Size: Adult Large)   Pulse 93   Temp 97  F (36.1  C) (Temporal)   Resp 21   Wt 133 kg (293 lb 4.8 oz)   LMP 07/23/2024 (Exact Date)   SpO2 97%   BMI 47.34 kg/m    Body mass index is 47.34 kg/m .  Physical Exam   GENERAL: alert and no distress  NECK: no adenopathy, no asymmetry, masses, or scars  RESP: lungs clear to auscultation - no rales, rhonchi or wheezes  CV: regular rate and rhythm, normal S1 S2, no S3 or S4, no murmur, click or rub, no peripheral edema  ABDOMEN: soft, nontender, no hepatosplenomegaly, no masses and bowel sounds normal  MS: Left knee with painful ROM,  Right wrist with decreased and painful ROM            Signed Electronically by: Max Diaz MD

## 2024-09-19 ENCOUNTER — PATIENT OUTREACH (OUTPATIENT)
Dept: CARE COORDINATION | Facility: CLINIC | Age: 49
End: 2024-09-19
Payer: COMMERCIAL

## 2024-09-23 ENCOUNTER — PATIENT OUTREACH (OUTPATIENT)
Dept: CARE COORDINATION | Facility: CLINIC | Age: 49
End: 2024-09-23
Payer: COMMERCIAL

## 2024-10-05 ENCOUNTER — MYC REFILL (OUTPATIENT)
Dept: INTERNAL MEDICINE | Facility: CLINIC | Age: 49
End: 2024-10-05
Payer: COMMERCIAL

## 2024-10-05 ENCOUNTER — HEALTH MAINTENANCE LETTER (OUTPATIENT)
Age: 49
End: 2024-10-05

## 2024-10-05 DIAGNOSIS — F32.1 CURRENT MODERATE EPISODE OF MAJOR DEPRESSIVE DISORDER WITHOUT PRIOR EPISODE (H): ICD-10-CM

## 2024-10-16 ENCOUNTER — OFFICE VISIT (OUTPATIENT)
Dept: INTERNAL MEDICINE | Facility: CLINIC | Age: 49
End: 2024-10-16
Payer: COMMERCIAL

## 2024-10-16 VITALS
HEART RATE: 81 BPM | TEMPERATURE: 97.5 F | WEIGHT: 284.7 LBS | HEIGHT: 66 IN | SYSTOLIC BLOOD PRESSURE: 145 MMHG | OXYGEN SATURATION: 97 % | RESPIRATION RATE: 18 BRPM | DIASTOLIC BLOOD PRESSURE: 81 MMHG | BODY MASS INDEX: 45.76 KG/M2

## 2024-10-16 DIAGNOSIS — F32.1 CURRENT MODERATE EPISODE OF MAJOR DEPRESSIVE DISORDER WITHOUT PRIOR EPISODE (H): ICD-10-CM

## 2024-10-16 DIAGNOSIS — Z12.4 CERVICAL CANCER SCREENING: ICD-10-CM

## 2024-10-16 DIAGNOSIS — F51.01 PRIMARY INSOMNIA: ICD-10-CM

## 2024-10-16 DIAGNOSIS — E78.5 HYPERLIPIDEMIA LDL GOAL <70: ICD-10-CM

## 2024-10-16 DIAGNOSIS — I10 PRIMARY HYPERTENSION: ICD-10-CM

## 2024-10-16 DIAGNOSIS — E11.9 TYPE 2 DIABETES MELLITUS WITHOUT COMPLICATION, WITHOUT LONG-TERM CURRENT USE OF INSULIN (H): ICD-10-CM

## 2024-10-16 DIAGNOSIS — Z00.00 ROUTINE GENERAL MEDICAL EXAMINATION AT A HEALTH CARE FACILITY: Primary | ICD-10-CM

## 2024-10-16 LAB
CREAT UR-MCNC: 241 MG/DL
MICROALBUMIN UR-MCNC: <12 MG/L
MICROALBUMIN/CREAT UR: NORMAL MG/G{CREAT}

## 2024-10-16 PROCEDURE — 90471 IMMUNIZATION ADMIN: CPT | Performed by: INTERNAL MEDICINE

## 2024-10-16 PROCEDURE — 99214 OFFICE O/P EST MOD 30 MIN: CPT | Mod: 25 | Performed by: INTERNAL MEDICINE

## 2024-10-16 PROCEDURE — 87624 HPV HI-RISK TYP POOLED RSLT: CPT | Performed by: INTERNAL MEDICINE

## 2024-10-16 PROCEDURE — 90677 PCV20 VACCINE IM: CPT | Performed by: INTERNAL MEDICINE

## 2024-10-16 PROCEDURE — 82570 ASSAY OF URINE CREATININE: CPT | Performed by: INTERNAL MEDICINE

## 2024-10-16 PROCEDURE — 99396 PREV VISIT EST AGE 40-64: CPT | Mod: 57 | Performed by: INTERNAL MEDICINE

## 2024-10-16 PROCEDURE — G0145 SCR C/V CYTO,THINLAYER,RESCR: HCPCS | Performed by: INTERNAL MEDICINE

## 2024-10-16 PROCEDURE — 82043 UR ALBUMIN QUANTITATIVE: CPT | Performed by: INTERNAL MEDICINE

## 2024-10-16 RX ORDER — LISINOPRIL 10 MG/1
10 TABLET ORAL DAILY
Qty: 90 TABLET | Refills: 0 | Status: SHIPPED | OUTPATIENT
Start: 2024-10-16

## 2024-10-16 RX ORDER — TRAZODONE HYDROCHLORIDE 50 MG/1
50 TABLET, FILM COATED ORAL AT BEDTIME
Qty: 90 TABLET | Refills: 0 | Status: SHIPPED | OUTPATIENT
Start: 2024-10-16

## 2024-10-16 RX ORDER — HYDROCHLOROTHIAZIDE 12.5 MG/1
12.5 TABLET ORAL DAILY
Qty: 90 TABLET | Refills: 0 | Status: SHIPPED | OUTPATIENT
Start: 2024-10-16

## 2024-10-16 RX ORDER — ROSUVASTATIN CALCIUM 10 MG/1
10 TABLET, COATED ORAL DAILY
Qty: 90 TABLET | Refills: 3 | Status: SHIPPED | OUTPATIENT
Start: 2024-10-16

## 2024-10-16 SDOH — HEALTH STABILITY: PHYSICAL HEALTH: ON AVERAGE, HOW MANY DAYS PER WEEK DO YOU ENGAGE IN MODERATE TO STRENUOUS EXERCISE (LIKE A BRISK WALK)?: 2 DAYS

## 2024-10-16 SDOH — HEALTH STABILITY: PHYSICAL HEALTH: ON AVERAGE, HOW MANY MINUTES DO YOU ENGAGE IN EXERCISE AT THIS LEVEL?: 20 MIN

## 2024-10-16 ASSESSMENT — ANXIETY QUESTIONNAIRES
1. FEELING NERVOUS, ANXIOUS, OR ON EDGE: SEVERAL DAYS
6. BECOMING EASILY ANNOYED OR IRRITABLE: NOT AT ALL
3. WORRYING TOO MUCH ABOUT DIFFERENT THINGS: NEARLY EVERY DAY
7. FEELING AFRAID AS IF SOMETHING AWFUL MIGHT HAPPEN: NEARLY EVERY DAY
2. NOT BEING ABLE TO STOP OR CONTROL WORRYING: NEARLY EVERY DAY
GAD7 TOTAL SCORE: 10
GAD7 TOTAL SCORE: 10
IF YOU CHECKED OFF ANY PROBLEMS ON THIS QUESTIONNAIRE, HOW DIFFICULT HAVE THESE PROBLEMS MADE IT FOR YOU TO DO YOUR WORK, TAKE CARE OF THINGS AT HOME, OR GET ALONG WITH OTHER PEOPLE: SOMEWHAT DIFFICULT
5. BEING SO RESTLESS THAT IT IS HARD TO SIT STILL: NOT AT ALL

## 2024-10-16 ASSESSMENT — SOCIAL DETERMINANTS OF HEALTH (SDOH): HOW OFTEN DO YOU GET TOGETHER WITH FRIENDS OR RELATIVES?: TWICE A WEEK

## 2024-10-16 ASSESSMENT — PATIENT HEALTH QUESTIONNAIRE - PHQ9
SUM OF ALL RESPONSES TO PHQ QUESTIONS 1-9: 0
5. POOR APPETITE OR OVEREATING: NOT AT ALL

## 2024-10-16 ASSESSMENT — PAIN SCALES - GENERAL: PAINLEVEL: NO PAIN (0)

## 2024-10-16 NOTE — PATIENT INSTRUCTIONS
Patient Education   Preventive Care Advice   This is general advice given by our system to help you stay healthy. However, your care team may have specific advice just for you. Please talk to your care team about your preventive care needs.  Nutrition  Eat 5 or more servings of fruits and vegetables each day.  Try wheat bread, brown rice and whole grain pasta (instead of white bread, rice, and pasta).  Get enough calcium and vitamin D. Check the label on foods and aim for 100% of the RDA (recommended daily allowance).  Lifestyle  Exercise at least 150 minutes each week  (30 minutes a day, 5 days a week).  Do muscle strengthening activities 2 days a week. These help control your weight and prevent disease.  No smoking.  Wear sunscreen to prevent skin cancer.  Have a dental exam and cleaning every 6 months.  Yearly exams  See your health care team every year to talk about:  Any changes in your health.  Any medicines your care team has prescribed.  Preventive care, family planning, and ways to prevent chronic diseases.  Shots (vaccines)   HPV shots (up to age 26), if you've never had them before.  Hepatitis B shots (up to age 59), if you've never had them before.  COVID-19 shot: Get this shot when it's due.  Flu shot: Get a flu shot every year.  Tetanus shot: Get a tetanus shot every 10 years.  Pneumococcal, hepatitis A, and RSV shots: Ask your care team if you need these based on your risk.  Shingles shot (for age 50 and up)  General health tests  Diabetes screening:  Starting at age 35, Get screened for diabetes at least every 3 years.  If you are younger than age 35, ask your care team if you should be screened for diabetes.  Cholesterol test: At age 39, start having a cholesterol test every 5 years, or more often if advised.  Bone density scan (DEXA): At age 50, ask your care team if you should have this scan for osteoporosis (brittle bones).  Hepatitis C: Get tested at least once in your life.  STIs (sexually  transmitted infections)  Before age 24: Ask your care team if you should be screened for STIs.  After age 24: Get screened for STIs if you're at risk. You are at risk for STIs (including HIV) if:  You are sexually active with more than one person.  You don't use condoms every time.  You or a partner was diagnosed with a sexually transmitted infection.  If you are at risk for HIV, ask about PrEP medicine to prevent HIV.  Get tested for HIV at least once in your life, whether you are at risk for HIV or not.  Cancer screening tests  Cervical cancer screening: If you have a cervix, begin getting regular cervical cancer screening tests starting at age 21.  Breast cancer scan (mammogram): If you've ever had breasts, begin having regular mammograms starting at age 40. This is a scan to check for breast cancer.  Colon cancer screening: It is important to start screening for colon cancer at age 45.  Have a colonoscopy test every 10 years (or more often if you're at risk) Or, ask your provider about stool tests like a FIT test every year or Cologuard test every 3 years.  To learn more about your testing options, visit:   .  For help making a decision, visit:   https://bit.ly/zv34741.  Prostate cancer screening test: If you have a prostate, ask your care team if a prostate cancer screening test (PSA) at age 55 is right for you.  Lung cancer screening: If you are a current or former smoker ages 50 to 80, ask your care team if ongoing lung cancer screenings are right for you.  For informational purposes only. Not to replace the advice of your health care provider. Copyright   2023 [employer]. All rights reserved. Clinically reviewed by the [facility]. [employer] [id number] - REV 01/24.

## 2024-10-16 NOTE — PROGRESS NOTES
"Preventive Care Visit  [facility]  Max Diaz MD, Internal Medicine  Oct 16, 2024      Assessment & Plan     Routine general medical examination at a health care facility      Type 2 diabetes mellitus without complication, without long-term current use of insulin (H)  Continue metformin, urine microalbumin ordered      Cervical cancer screening  Pap smear completed today    Hyperlipidemia LDL goal <70  Continue Crestor, refill provided    Primary hypertension  Continue lisinopril and hydrochlorothiazide    Current moderate episode of major depressive disorder without prior episode (H)  Continue Zoloft    Primary insomnia  She feels trazodone is not working well enough, advised her to wean it down over the next 2 to 3 weeks      Patient has been advised of split billing requirements and indicates understanding: Yes        BMI  Estimated body mass index is 46.3 kg/m  as calculated from the following:    Height as of this encounter: 1.67 m (5' 5.75\").    Weight as of this encounter: 129.1 kg (284 lb 11.2 oz).   Weight management plan: Discussed healthy diet and exercise guidelines    Counseling  Appropriate preventive services were addressed with this patient via screening, questionnaire, or discussion as appropriate for fall prevention, nutrition, physical activity, Tobacco-use cessation, social engagement, weight loss and cognition.  Checklist reviewing preventive services available has been given to the patient.  Reviewed patient's diet, addressing concerns and/or questions.   She is at risk for lack of exercise and has been provided with information to increase physical activity for the benefit of her well-being.   The patient was instructed to see the dentist every 6 months.       Follow-up in 3 months    Lewis Morgan is a 48 year old, presenting for the following:  Physical        10/16/2024     3:52 PM   Additional Questions   Roomed by [facility] " Directive  Patient does not have a Health Care Directive or Living Will: Discussed advance care planning with patient; information given to patient to review.    HPI    Doing better.    Her right wrist pain is improving , however she is not back to normal yet.   needs updated [facility] paperwork extending her leave till October 31st.    Seeing therapist for anxiety and depression.    Feels like Trazodone is not working for sleep.    Continues on hypertension medication.        10/16/2024   General Health   How would you rate your overall physical health? (!) FAIR   Feel stress (tense, anxious, or unable to sleep) To some extent      (!) STRESS CONCERN      10/16/2024   Nutrition   Three or more servings of calcium each day? Yes   Diet: Low salt    Low fat/cholesterol    Diabetic   How many servings of fruit and vegetables per day? (!) 2-3   How many sweetened beverages each day? 0-1       Multiple values from one day are sorted in reverse-chronological order         10/16/2024   Exercise   Days per week of moderate/strenous exercise 2 days   Average minutes spent exercising at this level 20 min      (!) EXERCISE CONCERN      10/16/2024   Social Factors   Frequency of gathering with friends or relatives Twice a week   Worry food won't last until get money to buy more No   Food not last or not have enough money for food? No   Do you have housing? (Housing is defined as stable permanent housing and does not include staying ouside in a car, in a tent, in an abandoned building, in an overnight shelter, or couch-surfing.) Yes   Are you worried about losing your housing? No   Lack of transportation? No   Unable to get utilities (heat,electricity)? No            10/16/2024   Dental   Dentist two times every year? (!) NO            10/16/2024   TB Screening   Were you born outside of the [place]? No            Today's PHQ-9 Score:       10/16/2024     3:59 PM   PHQ-9 SCORE   PHQ-9 Total Score 0           10/16/2024   Substance Use  "  Alcohol more than 3/day or more than 7/wk No   Do you use any other substances recreationally? No        Social History     Tobacco Use    Smoking status: Never    Smokeless tobacco: Never   Vaping Use    Vaping status: Never Used          Mammogram Screening - Mammogram every 1-2 years updated in Health Maintenance based on mutual decision making        10/16/2024   STI Screening   New sexual partner(s) since last STI/HIV test? No        History of abnormal Pap smear: No - age 30- 64 PAP with HPV every 5 years recommended       ASCVD Risk   The 10-year ASCVD risk score (Jennifer GUAJARDO, et al., 2019) is: 16.5%    Values used to calculate the score:      Age: 48 years      Sex: Female      Is Non- : Yes      Diabetic: Yes      Tobacco smoker: No      Systolic Blood Pressure: 145 mmHg      Is BP treated: Yes      HDL Cholesterol: 39 mg/dL      Total Cholesterol: 166 mg/dL        10/16/2024   Contraception/Family Planning   Questions about contraception or family planning No           Reviewed and updated as needed this visit by Provider                    History reviewed.       Review of Systems  Constitutional, HEENT, cardiovascular, pulmonary, GI, , musculoskeletal, neuro, skin, endocrine and psych systems are negative, except as otherwise noted.     Objective    Exam  BP (!) 145/81   Pulse 81   Temp 97.5  F (36.4  C) (Temporal)   Resp 18   Ht 1.67 m (5' 5.75\")   Wt 129.1 kg (284 lb 11.2 oz)   LMP 09/10/2024 (Within Days)   SpO2 97%   BMI 46.30 kg/m     Estimated body mass index is 46.3 kg/m  as calculated from the following:    Height as of this encounter: 1.67 m (5' 5.75\").    Weight as of this encounter: 129.1 kg (284 lb 11.2 oz).    Physical Exam  GENERAL: alert and no distress  EYES: Eyes grossly normal to inspection, PERRL and conjunctivae and sclerae normal  HENT: ear canals and TM's normal, nose and mouth without ulcers or lesions  NECK: no adenopathy, no asymmetry, " masses, or scars  RESP: lungs clear to auscultation - no rales, rhonchi or wheezes  CV: regular rate and rhythm, normal S1 S2, no S3 or S4, no murmur, click or rub, no peripheral edema  ABDOMEN: soft, nontender, no hepatosplenomegaly, no masses and bowel sounds normal  MS: no gross musculoskeletal defects noted, no edema  SKIN: no suspicious lesions or rashes  NEURO: Normal strength and tone, mentation intact and speech normal  PSYCH: mentation appears normal, affect normal/bright  Pap smear completed today        Signed Electronically by: Max Diaz MD

## 2024-10-17 ENCOUNTER — TELEPHONE (OUTPATIENT)
Dept: INTERNAL MEDICINE | Facility: CLINIC | Age: 49
End: 2024-10-17
Payer: COMMERCIAL

## 2024-10-17 LAB
HPV HR 12 DNA CVX QL NAA+PROBE: NEGATIVE
HPV16 DNA CVX QL NAA+PROBE: NEGATIVE
HPV18 DNA CVX QL NAA+PROBE: NEGATIVE
HUMAN PAPILLOMA VIRUS FINAL DIAGNOSIS: NORMAL

## 2024-10-17 NOTE — TELEPHONE ENCOUNTER
Forms/Letter Request    Type of form/letter: FMLA - Unknown  LOV: 10/16/24    Is Release of Information needed?: no    Do we have the form/letter: Yes: placed in provider's folder for completion    Who is the form from? [employer]    Where did/will the form come from? form was faxed in    When is form/letter needed by: asap    How would you like the form/letter returned: Fax : [phone]

## 2024-10-21 LAB
BKR AP ASSOCIATED HPV REPORT: NORMAL
BKR LAB AP GYN ADEQUACY: NORMAL
BKR LAB AP GYN INTERPRETATION: NORMAL
BKR LAB AP LMP: NORMAL
BKR LAB AP PREVIOUS ABNORMAL: NORMAL
PATH REPORT.COMMENTS IMP SPEC: NORMAL
PATH REPORT.COMMENTS IMP SPEC: NORMAL
PATH REPORT.RELEVANT HX SPEC: NORMAL

## 2024-10-21 NOTE — TELEPHONE ENCOUNTER
April states majority of FMLA form was not completed, and requesting clarification.     One question on form asks 'Is patient considered disabled for working?' Provider checked 'no', however attached 10/16/24 OV note suggests a possible extension until Oct 31st.     Requesting clarification if patient's Leave of Absence is supported by provider, including a note clarifying dates that leave of absence are supported by provider and faxed to [phone].

## 2024-10-21 NOTE — TELEPHONE ENCOUNTER
Patient called and is requesting pcp put on her fmla form that the reason is for her wrist and the dates she is requesting off be 10/16/2024 -11/02/2024.

## 2024-10-24 NOTE — TELEPHONE ENCOUNTER
[place] papers filled out and given to TC.  I have filed out multiple forms for the same reason.  This will be the last one I will fill out.

## 2024-10-24 NOTE — TELEPHONE ENCOUNTER
Patient following up on FMLA forms. She states that they need to be faxed back to her employer before 3 PM tomorrow. If form is not received patient will not be paid and then she will not be able to pay her rent. Jen Owens RN

## 2024-10-24 NOTE — TELEPHONE ENCOUNTER
Patient given message from Dr. Diaz. Patient was grateful for the form. She states that this will be the last form requested.   Informed patient that the form was faxed as noted below. Jen Owens RN

## 2024-11-11 ENCOUNTER — MYC REFILL (OUTPATIENT)
Dept: INTERNAL MEDICINE | Facility: CLINIC | Age: 49
End: 2024-11-11
Payer: COMMERCIAL

## 2024-11-11 DIAGNOSIS — F32.1 CURRENT MODERATE EPISODE OF MAJOR DEPRESSIVE DISORDER WITHOUT PRIOR EPISODE (H): ICD-10-CM

## 2024-11-11 DIAGNOSIS — E11.9 TYPE 2 DIABETES MELLITUS WITHOUT COMPLICATION, WITHOUT LONG-TERM CURRENT USE OF INSULIN (H): ICD-10-CM

## 2024-12-10 ENCOUNTER — PATIENT OUTREACH (OUTPATIENT)
Dept: CARE COORDINATION | Facility: CLINIC | Age: 49
End: 2024-12-10
Payer: COMMERCIAL

## 2025-01-07 ENCOUNTER — OFFICE VISIT (OUTPATIENT)
Dept: INTERNAL MEDICINE | Facility: CLINIC | Age: 50
End: 2025-01-07
Payer: COMMERCIAL

## 2025-01-07 VITALS
WEIGHT: 280 LBS | SYSTOLIC BLOOD PRESSURE: 132 MMHG | TEMPERATURE: 96.3 F | BODY MASS INDEX: 45 KG/M2 | HEIGHT: 66 IN | RESPIRATION RATE: 16 BRPM | HEART RATE: 77 BPM | OXYGEN SATURATION: 100 % | DIASTOLIC BLOOD PRESSURE: 77 MMHG

## 2025-01-07 DIAGNOSIS — F51.01 PRIMARY INSOMNIA: ICD-10-CM

## 2025-01-07 DIAGNOSIS — I10 PRIMARY HYPERTENSION: ICD-10-CM

## 2025-01-07 DIAGNOSIS — F32.1 CURRENT MODERATE EPISODE OF MAJOR DEPRESSIVE DISORDER WITHOUT PRIOR EPISODE (H): ICD-10-CM

## 2025-01-07 DIAGNOSIS — E11.9 TYPE 2 DIABETES MELLITUS WITHOUT COMPLICATION, WITHOUT LONG-TERM CURRENT USE OF INSULIN (H): Primary | ICD-10-CM

## 2025-01-07 DIAGNOSIS — Z82.49 FAMILY HISTORY OF HEART FAILURE: ICD-10-CM

## 2025-01-07 DIAGNOSIS — N95.1 MENOPAUSAL SYNDROME (HOT FLASHES): ICD-10-CM

## 2025-01-07 DIAGNOSIS — E78.5 HYPERLIPIDEMIA LDL GOAL <70: ICD-10-CM

## 2025-01-07 LAB
EST. AVERAGE GLUCOSE BLD GHB EST-MCNC: 134 MG/DL
HBA1C MFR BLD: 6.3 % (ref 0–5.6)

## 2025-01-07 PROCEDURE — 99214 OFFICE O/P EST MOD 30 MIN: CPT

## 2025-01-07 PROCEDURE — 83001 ASSAY OF GONADOTROPIN (FSH): CPT

## 2025-01-07 PROCEDURE — G2211 COMPLEX E/M VISIT ADD ON: HCPCS

## 2025-01-07 PROCEDURE — 36415 COLL VENOUS BLD VENIPUNCTURE: CPT

## 2025-01-07 PROCEDURE — 83036 HEMOGLOBIN GLYCOSYLATED A1C: CPT

## 2025-01-07 PROCEDURE — 82670 ASSAY OF TOTAL ESTRADIOL: CPT

## 2025-01-07 PROCEDURE — 80048 BASIC METABOLIC PNL TOTAL CA: CPT

## 2025-01-07 RX ORDER — TRAZODONE HYDROCHLORIDE 50 MG/1
50 TABLET, FILM COATED ORAL AT BEDTIME
Qty: 90 TABLET | Refills: 3 | Status: SHIPPED | OUTPATIENT
Start: 2025-01-07

## 2025-01-07 RX ORDER — LISINOPRIL 10 MG/1
10 TABLET ORAL DAILY
Qty: 90 TABLET | Refills: 3 | Status: SHIPPED | OUTPATIENT
Start: 2025-01-07

## 2025-01-07 RX ORDER — ROSUVASTATIN CALCIUM 10 MG/1
10 TABLET, COATED ORAL DAILY
Qty: 90 TABLET | Refills: 3 | Status: SHIPPED | OUTPATIENT
Start: 2025-01-07

## 2025-01-07 RX ORDER — HYDROCHLOROTHIAZIDE 12.5 MG/1
12.5 TABLET ORAL DAILY
Qty: 90 TABLET | Refills: 3 | Status: SHIPPED | OUTPATIENT
Start: 2025-01-07

## 2025-01-07 ASSESSMENT — PATIENT HEALTH QUESTIONNAIRE - PHQ9: SUM OF ALL RESPONSES TO PHQ QUESTIONS 1-9: 1

## 2025-01-07 NOTE — PROGRESS NOTES
Assessment & Plan     Pt presents today to establish care. She should see me in 6 months for diabetes follow up visit.     (E11.9) Type 2 diabetes mellitus without complication, without long-term current use of insulin (H)  (primary encounter diagnosis)  Comment: Update A1C. Continue Metformin 500MG  Plan: HEMOGLOBIN A1C, blood glucose monitoring (NO         BRAND SPECIFIED) meter device kit, blood         glucose (NO BRAND SPECIFIED) test strip, blood         glucose (NO BRAND SPECIFIED) lancets standard,         Basic metabolic panel  (Ca, Cl, CO2, Creat,         Gluc, K, Na, BUN), metFORMIN (GLUCOPHAGE) 500         MG tablet            (N95.1) Menopausal syndrome (hot flashes)  Comment:   She does endorse occasional hot flashes. Menses has been irregular over the past year, occurring every 1-3 months. Today she tells me her last menses was 3 months ago. Will check estrogen and FSH levels today.  Could consider HRT or treatment with gabapentin, effexor, etc.   She denies current tobacco use or family history of breast cancer.   Plan: Estradiol, Follicle stimulating hormone            (I10) Primary hypertension  Comment:   Plan: hydroCHLOROthiazide 12.5 MG tablet, lisinopril         (ZESTRIL) 10 MG tablet, Echocardiogram Complete            (F32.1) Current moderate episode of major depressive disorder without prior episode (H)  Comment:   Plan: sertraline (ZOLOFT) 50 MG tablet            (F51.01) Primary insomnia  Comment:   Plan: traZODone (DESYREL) 50 MG tablet            (E78.5) Hyperlipidemia LDL goal <70  Comment:   Plan: rosuvastatin (CRESTOR) 10 MG tablet            (Z82.49) Family history of heart failure  Comment:   She has strong family history of heart failure. Her mother passed away from heart failure at 59 and her father passed away from heart failure at age 69. Given this strong family history and patient's history of HTN, hyperlipidemia and T2DM, I do think she could benefit from having baseline  "ECHO done. Pt denies any chest pain or chest pressure.   Plan: Echocardiogram Complete          The longitudinal plan of care for the diagnosis(es)/condition(s) as documented were addressed during this visit. Due to the added complexity in care, I will continue to support Cathy in the subsequent management and with ongoing continuity of care.        Subjective   Cathy is a 49 year old, presenting for the following health issues:  Diabetes        1/7/2025     1:57 PM   Additional Questions   Roomed by Ellie     History of Present Illness       Diabetes:   She presents for follow up of diabetes.    She is not checking blood glucose.        She is concerned about other.    She is not experiencing numbness or burning in feet, excessive thirst, blurry vision, weight changes or redness, sores or blisters on feet. The patient has not had a diabetic eye exam in the last 12 months.          Hypertension: She presents for follow up of hypertension.  She does not check blood pressure  regularly outside of the clinic. Outpatient blood pressures have not been over 140/90. She follows a low salt diet.     She eats 4 or more servings of fruits and vegetables daily.She consumes 0 sweetened beverage(s) daily.She exercises with enough effort to increase her heart rate 10 to 19 minutes per day.  She exercises with enough effort to increase her heart rate 4 days per week.   She is taking medications regularly.             BP Readings from Last 2 Encounters:   01/07/25 132/77   10/16/24 (!) 145/81     Hemoglobin A1C (%)   Date Value   09/18/2024 6.7 (H)   03/18/2024 7.1 (H)     LDL Cholesterol Calculated (mg/dL)   Date Value   03/18/2024 106 (H)                   Objective    /77   Pulse 77   Temp (!) 96.3  F (35.7  C) (Tympanic)   Resp 16   Ht 1.676 m (5' 6\")   Wt 127 kg (280 lb)   LMP 10/15/2024 (Approximate)   SpO2 100%   BMI 45.19 kg/m    Body mass index is 45.19 kg/m .      Physical Exam  Constitutional:       General: " She is not in acute distress.     Appearance: Normal appearance. She is not ill-appearing, toxic-appearing or diaphoretic.   HENT:      Head: Normocephalic and atraumatic.   Eyes:      Conjunctiva/sclera: Conjunctivae normal.   Cardiovascular:      Rate and Rhythm: Normal rate and regular rhythm.      Heart sounds: Normal heart sounds.   Pulmonary:      Effort: Pulmonary effort is normal.      Breath sounds: Normal breath sounds.   Skin:     General: Skin is warm and dry.   Neurological:      Mental Status: She is alert and oriented to person, place, and time.   Psychiatric:         Mood and Affect: Mood normal.         Behavior: Behavior normal.         Thought Content: Thought content normal.         Judgment: Judgment normal.               Signed Electronically by: KARLI Jaime CNP

## 2025-01-08 ENCOUNTER — PATIENT OUTREACH (OUTPATIENT)
Dept: CARE COORDINATION | Facility: CLINIC | Age: 50
End: 2025-01-08
Payer: COMMERCIAL

## 2025-01-08 LAB
ANION GAP SERPL CALCULATED.3IONS-SCNC: 9 MMOL/L (ref 7–15)
BUN SERPL-MCNC: 13.5 MG/DL (ref 6–20)
CALCIUM SERPL-MCNC: 9.9 MG/DL (ref 8.8–10.4)
CHLORIDE SERPL-SCNC: 101 MMOL/L (ref 98–107)
CREAT SERPL-MCNC: 0.85 MG/DL (ref 0.51–0.95)
EGFRCR SERPLBLD CKD-EPI 2021: 84 ML/MIN/1.73M2
ESTRADIOL SERPL-MCNC: 19 PG/ML
FSH SERPL IRP2-ACNC: 26.7 MIU/ML
GLUCOSE SERPL-MCNC: 102 MG/DL (ref 70–99)
HCO3 SERPL-SCNC: 30 MMOL/L (ref 22–29)
POTASSIUM SERPL-SCNC: 4.4 MMOL/L (ref 3.4–5.3)
SODIUM SERPL-SCNC: 140 MMOL/L (ref 135–145)

## 2025-01-15 ENCOUNTER — HOSPITAL ENCOUNTER (OUTPATIENT)
Dept: MAMMOGRAPHY | Facility: CLINIC | Age: 50
Discharge: HOME | End: 2025-01-15
Attending: INTERNAL MEDICINE
Payer: COMMERCIAL

## 2025-01-15 DIAGNOSIS — Z12.31 VISIT FOR SCREENING MAMMOGRAM: ICD-10-CM

## 2025-01-15 PROCEDURE — 77063 BREAST TOMOSYNTHESIS BI: CPT

## 2025-01-15 PROCEDURE — 77067 SCR MAMMO BI INCL CAD: CPT

## 2025-02-12 ENCOUNTER — MYC REFILL (OUTPATIENT)
Dept: INTERNAL MEDICINE | Facility: CLINIC | Age: 50
End: 2025-02-12
Payer: COMMERCIAL

## 2025-02-12 DIAGNOSIS — E11.9 TYPE 2 DIABETES MELLITUS WITHOUT COMPLICATION, WITHOUT LONG-TERM CURRENT USE OF INSULIN (H): ICD-10-CM

## 2025-02-12 DIAGNOSIS — F32.1 CURRENT MODERATE EPISODE OF MAJOR DEPRESSIVE DISORDER WITHOUT PRIOR EPISODE (H): ICD-10-CM

## 2025-02-12 DIAGNOSIS — E78.5 HYPERLIPIDEMIA LDL GOAL <70: ICD-10-CM

## 2025-02-12 DIAGNOSIS — I10 PRIMARY HYPERTENSION: ICD-10-CM

## 2025-02-12 RX ORDER — LISINOPRIL 10 MG/1
10 TABLET ORAL DAILY
Qty: 90 TABLET | Refills: 3 | OUTPATIENT
Start: 2025-02-12

## 2025-02-12 RX ORDER — HYDROCHLOROTHIAZIDE 12.5 MG/1
12.5 TABLET ORAL DAILY
Qty: 90 TABLET | Refills: 3 | OUTPATIENT
Start: 2025-02-12

## 2025-02-12 RX ORDER — ROSUVASTATIN CALCIUM 10 MG/1
10 TABLET, COATED ORAL DAILY
Qty: 90 TABLET | Refills: 3 | OUTPATIENT
Start: 2025-02-12

## 2025-04-27 ENCOUNTER — HEALTH MAINTENANCE LETTER (OUTPATIENT)
Age: 50
End: 2025-04-27

## 2025-05-20 ENCOUNTER — MYC MEDICAL ADVICE (OUTPATIENT)
Dept: INTERNAL MEDICINE | Facility: CLINIC | Age: 50
End: 2025-05-20
Payer: COMMERCIAL

## 2025-05-20 DIAGNOSIS — E11.9 TYPE 2 DIABETES MELLITUS WITHOUT COMPLICATION, WITHOUT LONG-TERM CURRENT USE OF INSULIN (H): Primary | ICD-10-CM

## 2025-05-22 NOTE — TELEPHONE ENCOUNTER
Sent [employer] message to patient.    Thank you,  Elie, Triage ZULEMA Ng    9:52 AM 5/22/2025

## 2025-05-26 ENCOUNTER — PATIENT OUTREACH (OUTPATIENT)
Dept: CARE COORDINATION | Facility: CLINIC | Age: 50
End: 2025-05-26
Payer: COMMERCIAL

## 2025-07-16 ENCOUNTER — TELEPHONE (OUTPATIENT)
Dept: INTERNAL MEDICINE | Facility: CLINIC | Age: 50
End: 2025-07-16
Payer: COMMERCIAL

## 2025-07-16 DIAGNOSIS — E11.9 TYPE 2 DIABETES MELLITUS WITHOUT COMPLICATION, WITHOUT LONG-TERM CURRENT USE OF INSULIN (H): ICD-10-CM

## 2025-07-17 NOTE — TELEPHONE ENCOUNTER
Last office visit - 01/07/2025    Attempt # 1  Called Phone # [phone]      Was Call answered? No     Non-detailed voicemail left on July 17, 2025 10:10 AM to call clinic at: [phone].     On Call Back:     Please relay primary care provider's message. Help set up appointment.      Thank you,  Elie, Triage ZULEMA Ng    10:10 AM 7/17/2025

## 2025-07-17 NOTE — TELEPHONE ENCOUNTER
She is required to be seen every 6 months with diabetes. I sent refill for 90 days. Please have her make appt. VV is fine.

## 2025-07-18 NOTE — TELEPHONE ENCOUNTER
2nd attempt.    Sent [employer] message to patient.    Thank you,  Elie, Triage ZULEMA Ng    9:31 AM 7/18/2025      Received report from day JARROD Cantu. Patient updated on POC. All needs met. States that the pain is better.

## 2025-07-21 NOTE — TELEPHONE ENCOUNTER
Patient read MyChart. Last read by Cathy Brown at 9:18PM on 7/18/2025.     Thank you,  Elie, Triage [facility]    9:31 AM 7/21/2025

## 2025-08-10 ENCOUNTER — HEALTH MAINTENANCE LETTER (OUTPATIENT)
Age: 50
End: 2025-08-10